# Patient Record
Sex: FEMALE | Race: BLACK OR AFRICAN AMERICAN | NOT HISPANIC OR LATINO | Employment: UNEMPLOYED | URBAN - METROPOLITAN AREA
[De-identification: names, ages, dates, MRNs, and addresses within clinical notes are randomized per-mention and may not be internally consistent; named-entity substitution may affect disease eponyms.]

---

## 2021-03-24 ENCOUNTER — OFFICE VISIT (OUTPATIENT)
Dept: URGENT CARE | Facility: CLINIC | Age: 21
End: 2021-03-24
Payer: COMMERCIAL

## 2021-03-24 VITALS
DIASTOLIC BLOOD PRESSURE: 71 MMHG | BODY MASS INDEX: 29.03 KG/M2 | RESPIRATION RATE: 16 BRPM | OXYGEN SATURATION: 99 % | HEART RATE: 73 BPM | WEIGHT: 185 LBS | TEMPERATURE: 99 F | HEIGHT: 67 IN | SYSTOLIC BLOOD PRESSURE: 121 MMHG
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
## 2021-03-24 DIAGNOSIS — S16.1XXA STRAIN OF NECK MUSCLE, INITIAL ENCOUNTER: Primary | ICD-10-CM

## 2021-03-24 DIAGNOSIS — Z76.89 ENCOUNTER TO ESTABLISH CARE WITH NEW DOCTOR: ICD-10-CM

## 2021-03-24 DIAGNOSIS — R51.9 ACUTE NONINTRACTABLE HEADACHE, UNSPECIFIED HEADACHE TYPE: ICD-10-CM

## 2021-03-24 DIAGNOSIS — M54.2 NECK PAIN: ICD-10-CM

## 2021-03-24 PROCEDURE — 99204 OFFICE O/P NEW MOD 45 MIN: CPT | Mod: S$GLB,,, | Performed by: PHYSICIAN ASSISTANT

## 2021-03-24 PROCEDURE — 3008F PR BODY MASS INDEX (BMI) DOCUMENTED: ICD-10-PCS | Mod: CPTII,S$GLB,, | Performed by: PHYSICIAN ASSISTANT

## 2021-03-24 PROCEDURE — 99204 PR OFFICE/OUTPT VISIT, NEW, LEVL IV, 45-59 MIN: ICD-10-PCS | Mod: S$GLB,,, | Performed by: PHYSICIAN ASSISTANT

## 2021-03-24 PROCEDURE — 3008F BODY MASS INDEX DOCD: CPT | Mod: CPTII,S$GLB,, | Performed by: PHYSICIAN ASSISTANT

## 2021-03-24 PROCEDURE — 1125F PR PAIN SEVERITY QUANTIFIED, PAIN PRESENT: ICD-10-PCS | Mod: S$GLB,,, | Performed by: PHYSICIAN ASSISTANT

## 2021-03-24 PROCEDURE — 72040 X-RAY EXAM NECK SPINE 2-3 VW: CPT | Mod: S$GLB,,, | Performed by: RADIOLOGY

## 2021-03-24 PROCEDURE — 72040 XR CERVICAL SPINE 2 OR 3 VIEWS: ICD-10-PCS | Mod: S$GLB,,, | Performed by: RADIOLOGY

## 2021-03-24 PROCEDURE — 1125F AMNT PAIN NOTED PAIN PRSNT: CPT | Mod: S$GLB,,, | Performed by: PHYSICIAN ASSISTANT

## 2021-03-24 RX ORDER — ONDANSETRON 4 MG/1
4 TABLET, ORALLY DISINTEGRATING ORAL EVERY 8 HOURS PRN
Qty: 12 TABLET | Refills: 0 | Status: SHIPPED | OUTPATIENT
Start: 2021-03-24 | End: 2021-03-30 | Stop reason: ALTCHOICE

## 2021-03-24 RX ORDER — ONDANSETRON 4 MG/1
4 TABLET, ORALLY DISINTEGRATING ORAL EVERY 8 HOURS PRN
Qty: 12 TABLET | Refills: 0 | Status: SHIPPED | OUTPATIENT
Start: 2021-03-24 | End: 2021-03-24 | Stop reason: CLARIF

## 2021-03-24 RX ORDER — METHOCARBAMOL 500 MG/1
500 TABLET, FILM COATED ORAL 4 TIMES DAILY PRN
Qty: 20 TABLET | Refills: 0 | Status: SHIPPED | OUTPATIENT
Start: 2021-03-24 | End: 2021-03-30 | Stop reason: ALTCHOICE

## 2021-03-24 RX ORDER — BUTALBITAL, ACETAMINOPHEN AND CAFFEINE 50; 325; 40 MG/1; MG/1; MG/1
1 TABLET ORAL EVERY 4 HOURS PRN
Qty: 8 TABLET | Refills: 0 | Status: SHIPPED | OUTPATIENT
Start: 2021-03-24 | End: 2021-03-25

## 2021-03-24 RX ORDER — DICLOFENAC SODIUM 50 MG/1
50 TABLET, DELAYED RELEASE ORAL 3 TIMES DAILY PRN
Qty: 21 TABLET | Refills: 0 | Status: SHIPPED | OUTPATIENT
Start: 2021-03-24 | End: 2021-03-24 | Stop reason: CLARIF

## 2021-03-24 RX ORDER — BUTALBITAL, ACETAMINOPHEN AND CAFFEINE 50; 325; 40 MG/1; MG/1; MG/1
1 TABLET ORAL EVERY 4 HOURS PRN
Qty: 8 TABLET | Refills: 0 | Status: SHIPPED | OUTPATIENT
Start: 2021-03-24 | End: 2021-03-24 | Stop reason: CLARIF

## 2021-03-24 RX ORDER — IBUPROFEN 800 MG/1
800 TABLET ORAL
Qty: 21 TABLET | Refills: 0 | Status: SHIPPED | OUTPATIENT
Start: 2021-03-24 | End: 2021-03-31

## 2021-03-24 RX ORDER — METHOCARBAMOL 500 MG/1
500 TABLET, FILM COATED ORAL 4 TIMES DAILY PRN
Qty: 20 TABLET | Refills: 0 | Status: SHIPPED | OUTPATIENT
Start: 2021-03-24 | End: 2021-03-24 | Stop reason: CLARIF

## 2021-03-30 ENCOUNTER — OFFICE VISIT (OUTPATIENT)
Dept: INTERNAL MEDICINE | Facility: CLINIC | Age: 21
End: 2021-03-30
Payer: COMMERCIAL

## 2021-03-30 VITALS
OXYGEN SATURATION: 98 % | DIASTOLIC BLOOD PRESSURE: 82 MMHG | WEIGHT: 192.44 LBS | BODY MASS INDEX: 30.2 KG/M2 | SYSTOLIC BLOOD PRESSURE: 130 MMHG | HEART RATE: 89 BPM | HEIGHT: 67 IN

## 2021-03-30 DIAGNOSIS — Z00.00 HEALTHCARE MAINTENANCE: ICD-10-CM

## 2021-03-30 DIAGNOSIS — F07.81 POST CONCUSSION SYNDROME: Primary | ICD-10-CM

## 2021-03-30 DIAGNOSIS — R51.9 ACUTE NONINTRACTABLE HEADACHE, UNSPECIFIED HEADACHE TYPE: ICD-10-CM

## 2021-03-30 PROCEDURE — 99213 PR OFFICE/OUTPT VISIT, EST, LEVL III, 20-29 MIN: ICD-10-PCS | Mod: S$GLB,,, | Performed by: STUDENT IN AN ORGANIZED HEALTH CARE EDUCATION/TRAINING PROGRAM

## 2021-03-30 PROCEDURE — 99999 PR PBB SHADOW E&M-EST. PATIENT-LVL III: CPT | Mod: PBBFAC,,, | Performed by: STUDENT IN AN ORGANIZED HEALTH CARE EDUCATION/TRAINING PROGRAM

## 2021-03-30 PROCEDURE — 99999 PR PBB SHADOW E&M-EST. PATIENT-LVL III: ICD-10-PCS | Mod: PBBFAC,,, | Performed by: STUDENT IN AN ORGANIZED HEALTH CARE EDUCATION/TRAINING PROGRAM

## 2021-03-30 PROCEDURE — 99213 OFFICE O/P EST LOW 20 MIN: CPT | Mod: S$GLB,,, | Performed by: STUDENT IN AN ORGANIZED HEALTH CARE EDUCATION/TRAINING PROGRAM

## 2021-03-30 RX ORDER — PROMETHAZINE HYDROCHLORIDE 12.5 MG/1
12.5 TABLET ORAL EVERY 6 HOURS PRN
Qty: 90 TABLET | Refills: 3 | Status: SHIPPED | OUTPATIENT
Start: 2021-03-30 | End: 2021-07-09

## 2021-03-30 RX ORDER — CYCLOBENZAPRINE HCL 10 MG
10 TABLET ORAL 3 TIMES DAILY PRN
Qty: 90 TABLET | Refills: 3 | Status: SHIPPED | OUTPATIENT
Start: 2021-03-30 | End: 2021-04-09

## 2021-04-01 ENCOUNTER — CLINICAL SUPPORT (OUTPATIENT)
Dept: REHABILITATION | Facility: HOSPITAL | Age: 21
End: 2021-04-01
Payer: COMMERCIAL

## 2021-04-01 DIAGNOSIS — H51.11 CONVERGENCE INSUFFICIENCY: ICD-10-CM

## 2021-04-01 DIAGNOSIS — G44.86 CERVICOGENIC HEADACHE: ICD-10-CM

## 2021-04-01 DIAGNOSIS — R51.9 ACUTE NONINTRACTABLE HEADACHE, UNSPECIFIED HEADACHE TYPE: ICD-10-CM

## 2021-04-01 DIAGNOSIS — M54.2 NECK PAIN, ACUTE: ICD-10-CM

## 2021-04-01 DIAGNOSIS — H55.82 DEFICIENT SMOOTH PURSUIT EYE MOVEMENTS: ICD-10-CM

## 2021-04-01 DIAGNOSIS — F07.81 POST CONCUSSION SYNDROME: ICD-10-CM

## 2021-04-01 DIAGNOSIS — H55.81 DEFICIENT SACCADIC EYE MOVEMENTS: ICD-10-CM

## 2021-04-01 PROCEDURE — 97162 PT EVAL MOD COMPLEX 30 MIN: CPT | Mod: PO

## 2021-04-09 ENCOUNTER — CLINICAL SUPPORT (OUTPATIENT)
Dept: REHABILITATION | Facility: HOSPITAL | Age: 21
End: 2021-04-09
Payer: COMMERCIAL

## 2021-04-09 DIAGNOSIS — H55.82 DEFICIENT SMOOTH PURSUIT EYE MOVEMENTS: ICD-10-CM

## 2021-04-09 DIAGNOSIS — G44.86 CERVICOGENIC HEADACHE: Primary | ICD-10-CM

## 2021-04-09 DIAGNOSIS — H51.11 CONVERGENCE INSUFFICIENCY: ICD-10-CM

## 2021-04-09 DIAGNOSIS — M54.2 NECK PAIN, ACUTE: ICD-10-CM

## 2021-04-09 DIAGNOSIS — H55.81 DEFICIENT SACCADIC EYE MOVEMENTS: ICD-10-CM

## 2021-04-09 PROCEDURE — 97140 MANUAL THERAPY 1/> REGIONS: CPT | Mod: PO

## 2021-04-09 PROCEDURE — 97110 THERAPEUTIC EXERCISES: CPT | Mod: PO

## 2021-04-13 ENCOUNTER — CLINICAL SUPPORT (OUTPATIENT)
Dept: REHABILITATION | Facility: HOSPITAL | Age: 21
End: 2021-04-13
Payer: COMMERCIAL

## 2021-04-13 DIAGNOSIS — H55.82 DEFICIENT SMOOTH PURSUIT EYE MOVEMENTS: ICD-10-CM

## 2021-04-13 DIAGNOSIS — H51.11 CONVERGENCE INSUFFICIENCY: ICD-10-CM

## 2021-04-13 DIAGNOSIS — G44.86 CERVICOGENIC HEADACHE: ICD-10-CM

## 2021-04-13 DIAGNOSIS — H55.81 DEFICIENT SACCADIC EYE MOVEMENTS: ICD-10-CM

## 2021-04-13 DIAGNOSIS — M54.2 NECK PAIN, ACUTE: Primary | ICD-10-CM

## 2021-04-13 PROCEDURE — 97140 MANUAL THERAPY 1/> REGIONS: CPT | Mod: PO

## 2021-04-13 PROCEDURE — 97110 THERAPEUTIC EXERCISES: CPT | Mod: PO

## 2021-04-14 ENCOUNTER — DOCUMENTATION ONLY (OUTPATIENT)
Dept: REHABILITATION | Facility: HOSPITAL | Age: 21
End: 2021-04-14

## 2021-04-16 ENCOUNTER — CLINICAL SUPPORT (OUTPATIENT)
Dept: REHABILITATION | Facility: HOSPITAL | Age: 21
End: 2021-04-16
Payer: COMMERCIAL

## 2021-04-16 DIAGNOSIS — H55.82 DEFICIENT SMOOTH PURSUIT EYE MOVEMENTS: ICD-10-CM

## 2021-04-16 DIAGNOSIS — H55.81 DEFICIENT SACCADIC EYE MOVEMENTS: ICD-10-CM

## 2021-04-16 DIAGNOSIS — G44.86 CERVICOGENIC HEADACHE: ICD-10-CM

## 2021-04-16 DIAGNOSIS — M54.2 NECK PAIN, ACUTE: ICD-10-CM

## 2021-04-16 DIAGNOSIS — H51.11 CONVERGENCE INSUFFICIENCY: ICD-10-CM

## 2021-04-16 PROCEDURE — 97112 NEUROMUSCULAR REEDUCATION: CPT | Mod: PO

## 2021-04-21 ENCOUNTER — NURSE TRIAGE (OUTPATIENT)
Dept: ADMINISTRATIVE | Facility: CLINIC | Age: 21
End: 2021-04-21

## 2021-04-22 ENCOUNTER — CLINICAL SUPPORT (OUTPATIENT)
Dept: REHABILITATION | Facility: HOSPITAL | Age: 21
End: 2021-04-22
Payer: COMMERCIAL

## 2021-04-22 DIAGNOSIS — M54.2 NECK PAIN, ACUTE: ICD-10-CM

## 2021-04-22 DIAGNOSIS — H55.81 DEFICIENT SACCADIC EYE MOVEMENTS: ICD-10-CM

## 2021-04-22 DIAGNOSIS — H55.82 DEFICIENT SMOOTH PURSUIT EYE MOVEMENTS: ICD-10-CM

## 2021-04-22 DIAGNOSIS — G44.86 CERVICOGENIC HEADACHE: ICD-10-CM

## 2021-04-22 DIAGNOSIS — H51.11 CONVERGENCE INSUFFICIENCY: ICD-10-CM

## 2021-04-22 PROCEDURE — 97110 THERAPEUTIC EXERCISES: CPT | Mod: PO

## 2021-04-22 PROCEDURE — 97140 MANUAL THERAPY 1/> REGIONS: CPT | Mod: PO

## 2021-04-27 ENCOUNTER — CLINICAL SUPPORT (OUTPATIENT)
Dept: REHABILITATION | Facility: HOSPITAL | Age: 21
End: 2021-04-27
Payer: COMMERCIAL

## 2021-04-27 DIAGNOSIS — H55.82 DEFICIENT SMOOTH PURSUIT EYE MOVEMENTS: ICD-10-CM

## 2021-04-27 DIAGNOSIS — H55.81 DEFICIENT SACCADIC EYE MOVEMENTS: ICD-10-CM

## 2021-04-27 DIAGNOSIS — H51.11 CONVERGENCE INSUFFICIENCY: ICD-10-CM

## 2021-04-27 DIAGNOSIS — M54.2 NECK PAIN, ACUTE: ICD-10-CM

## 2021-04-27 DIAGNOSIS — G44.86 CERVICOGENIC HEADACHE: ICD-10-CM

## 2021-04-27 PROCEDURE — 97110 THERAPEUTIC EXERCISES: CPT | Mod: PO

## 2021-04-27 PROCEDURE — 97140 MANUAL THERAPY 1/> REGIONS: CPT | Mod: PO

## 2021-05-05 ENCOUNTER — DOCUMENTATION ONLY (OUTPATIENT)
Dept: REHABILITATION | Facility: HOSPITAL | Age: 21
End: 2021-05-05

## 2021-05-05 ENCOUNTER — CLINICAL SUPPORT (OUTPATIENT)
Dept: REHABILITATION | Facility: HOSPITAL | Age: 21
End: 2021-05-05
Payer: COMMERCIAL

## 2021-05-05 DIAGNOSIS — M54.2 NECK PAIN, ACUTE: ICD-10-CM

## 2021-05-05 DIAGNOSIS — H55.82 DEFICIENT SMOOTH PURSUIT EYE MOVEMENTS: ICD-10-CM

## 2021-05-05 DIAGNOSIS — H55.81 DEFICIENT SACCADIC EYE MOVEMENTS: ICD-10-CM

## 2021-05-05 DIAGNOSIS — H51.11 CONVERGENCE INSUFFICIENCY: ICD-10-CM

## 2021-05-05 DIAGNOSIS — G44.86 CERVICOGENIC HEADACHE: ICD-10-CM

## 2021-05-05 PROCEDURE — 97110 THERAPEUTIC EXERCISES: CPT | Mod: PO

## 2021-05-05 PROCEDURE — 97140 MANUAL THERAPY 1/> REGIONS: CPT | Mod: PO

## 2021-05-11 ENCOUNTER — OFFICE VISIT (OUTPATIENT)
Dept: INTERNAL MEDICINE | Facility: CLINIC | Age: 21
End: 2021-05-11
Payer: COMMERCIAL

## 2021-05-11 VITALS
SYSTOLIC BLOOD PRESSURE: 112 MMHG | WEIGHT: 194.44 LBS | HEIGHT: 67 IN | HEART RATE: 91 BPM | BODY MASS INDEX: 30.52 KG/M2 | DIASTOLIC BLOOD PRESSURE: 66 MMHG | OXYGEN SATURATION: 99 %

## 2021-05-11 DIAGNOSIS — F41.9 ANXIETY: ICD-10-CM

## 2021-05-11 DIAGNOSIS — Z91.018 FOOD ALLERGY: Primary | ICD-10-CM

## 2021-05-11 DIAGNOSIS — G44.86 CERVICOGENIC HEADACHE: ICD-10-CM

## 2021-05-11 PROCEDURE — 99213 PR OFFICE/OUTPT VISIT, EST, LEVL III, 20-29 MIN: ICD-10-PCS | Mod: S$PBB,,, | Performed by: STUDENT IN AN ORGANIZED HEALTH CARE EDUCATION/TRAINING PROGRAM

## 2021-05-11 PROCEDURE — 99999 PR PBB SHADOW E&M-EST. PATIENT-LVL IV: CPT | Mod: PBBFAC,,, | Performed by: STUDENT IN AN ORGANIZED HEALTH CARE EDUCATION/TRAINING PROGRAM

## 2021-05-11 PROCEDURE — 99999 PR PBB SHADOW E&M-EST. PATIENT-LVL IV: ICD-10-PCS | Mod: PBBFAC,,, | Performed by: STUDENT IN AN ORGANIZED HEALTH CARE EDUCATION/TRAINING PROGRAM

## 2021-05-11 PROCEDURE — 99213 OFFICE O/P EST LOW 20 MIN: CPT | Mod: S$PBB,,, | Performed by: STUDENT IN AN ORGANIZED HEALTH CARE EDUCATION/TRAINING PROGRAM

## 2021-05-21 ENCOUNTER — OFFICE VISIT (OUTPATIENT)
Dept: PSYCHIATRY | Facility: CLINIC | Age: 21
End: 2021-05-21
Payer: COMMERCIAL

## 2021-05-21 DIAGNOSIS — F41.1 GAD (GENERALIZED ANXIETY DISORDER): ICD-10-CM

## 2021-05-21 DIAGNOSIS — F43.10 PTSD (POST-TRAUMATIC STRESS DISORDER): ICD-10-CM

## 2021-05-21 DIAGNOSIS — F33.2 SEVERE EPISODE OF RECURRENT MAJOR DEPRESSIVE DISORDER, WITHOUT PSYCHOTIC FEATURES: Primary | ICD-10-CM

## 2021-05-21 PROCEDURE — 90791 PR PSYCHIATRIC DIAGNOSTIC EVALUATION: ICD-10-PCS | Mod: S$GLB,,, | Performed by: SOCIAL WORKER

## 2021-05-21 PROCEDURE — 90791 PSYCH DIAGNOSTIC EVALUATION: CPT | Mod: S$GLB,,, | Performed by: SOCIAL WORKER

## 2021-05-25 ENCOUNTER — CLINICAL SUPPORT (OUTPATIENT)
Dept: REHABILITATION | Facility: HOSPITAL | Age: 21
End: 2021-05-25
Payer: COMMERCIAL

## 2021-05-25 DIAGNOSIS — H55.81 DEFICIENT SACCADIC EYE MOVEMENTS: ICD-10-CM

## 2021-05-25 DIAGNOSIS — M54.2 NECK PAIN, ACUTE: ICD-10-CM

## 2021-05-25 DIAGNOSIS — H51.11 CONVERGENCE INSUFFICIENCY: ICD-10-CM

## 2021-05-25 DIAGNOSIS — H55.82 DEFICIENT SMOOTH PURSUIT EYE MOVEMENTS: ICD-10-CM

## 2021-05-25 DIAGNOSIS — G44.86 CERVICOGENIC HEADACHE: ICD-10-CM

## 2021-05-25 PROCEDURE — 97140 MANUAL THERAPY 1/> REGIONS: CPT | Mod: PO,CQ

## 2021-05-25 PROCEDURE — 97110 THERAPEUTIC EXERCISES: CPT | Mod: PO,CQ

## 2021-05-27 ENCOUNTER — OFFICE VISIT (OUTPATIENT)
Dept: PSYCHIATRY | Facility: CLINIC | Age: 21
End: 2021-05-27
Payer: COMMERCIAL

## 2021-05-27 DIAGNOSIS — F33.2 SEVERE EPISODE OF RECURRENT MAJOR DEPRESSIVE DISORDER, WITHOUT PSYCHOTIC FEATURES: Primary | ICD-10-CM

## 2021-05-27 DIAGNOSIS — F41.1 GAD (GENERALIZED ANXIETY DISORDER): ICD-10-CM

## 2021-05-27 DIAGNOSIS — F43.10 PTSD (POST-TRAUMATIC STRESS DISORDER): ICD-10-CM

## 2021-05-27 PROCEDURE — 90834 PSYTX W PT 45 MINUTES: CPT | Mod: S$GLB,,, | Performed by: SOCIAL WORKER

## 2021-05-27 PROCEDURE — 90834 PR PSYCHOTHERAPY W/PATIENT, 45 MIN: ICD-10-PCS | Mod: S$GLB,,, | Performed by: SOCIAL WORKER

## 2021-05-31 ENCOUNTER — OFFICE VISIT (OUTPATIENT)
Dept: PSYCHIATRY | Facility: CLINIC | Age: 21
End: 2021-05-31
Payer: COMMERCIAL

## 2021-05-31 VITALS
BODY MASS INDEX: 29.57 KG/M2 | SYSTOLIC BLOOD PRESSURE: 112 MMHG | HEART RATE: 71 BPM | WEIGHT: 188.81 LBS | DIASTOLIC BLOOD PRESSURE: 73 MMHG

## 2021-05-31 DIAGNOSIS — F41.9 ANXIETY: ICD-10-CM

## 2021-05-31 DIAGNOSIS — M79.10 MYALGIA: ICD-10-CM

## 2021-05-31 DIAGNOSIS — R53.83 FATIGUE, UNSPECIFIED TYPE: ICD-10-CM

## 2021-05-31 DIAGNOSIS — F43.10 PTSD (POST-TRAUMATIC STRESS DISORDER): Primary | ICD-10-CM

## 2021-05-31 DIAGNOSIS — F41.1 GAD (GENERALIZED ANXIETY DISORDER): ICD-10-CM

## 2021-05-31 DIAGNOSIS — F33.2 SEVERE EPISODE OF RECURRENT MAJOR DEPRESSIVE DISORDER, WITHOUT PSYCHOTIC FEATURES: ICD-10-CM

## 2021-05-31 PROCEDURE — 99999 PR PBB SHADOW E&M-EST. PATIENT-LVL III: ICD-10-PCS | Mod: PBBFAC,,, | Performed by: NURSE PRACTITIONER

## 2021-05-31 PROCEDURE — 90792 PR PSYCHIATRIC DIAGNOSTIC EVALUATION W/MEDICAL SERVICES: ICD-10-PCS | Mod: S$GLB,,, | Performed by: NURSE PRACTITIONER

## 2021-05-31 PROCEDURE — 99999 PR PBB SHADOW E&M-EST. PATIENT-LVL III: CPT | Mod: PBBFAC,,, | Performed by: NURSE PRACTITIONER

## 2021-05-31 PROCEDURE — 90792 PSYCH DIAG EVAL W/MED SRVCS: CPT | Mod: S$GLB,,, | Performed by: NURSE PRACTITIONER

## 2021-05-31 RX ORDER — SERTRALINE HYDROCHLORIDE 25 MG/1
25 TABLET, FILM COATED ORAL DAILY
Qty: 30 TABLET | Refills: 1 | Status: SHIPPED | OUTPATIENT
Start: 2021-05-31 | End: 2021-06-28

## 2021-06-04 ENCOUNTER — DOCUMENTATION ONLY (OUTPATIENT)
Dept: REHABILITATION | Facility: HOSPITAL | Age: 21
End: 2021-06-04

## 2021-06-08 ENCOUNTER — CLINICAL SUPPORT (OUTPATIENT)
Dept: REHABILITATION | Facility: HOSPITAL | Age: 21
End: 2021-06-08
Payer: COMMERCIAL

## 2021-06-08 DIAGNOSIS — H55.81 DEFICIENT SACCADIC EYE MOVEMENTS: ICD-10-CM

## 2021-06-08 DIAGNOSIS — G44.86 CERVICOGENIC HEADACHE: ICD-10-CM

## 2021-06-08 DIAGNOSIS — M54.2 NECK PAIN, ACUTE: ICD-10-CM

## 2021-06-08 DIAGNOSIS — H51.11 CONVERGENCE INSUFFICIENCY: ICD-10-CM

## 2021-06-08 DIAGNOSIS — H55.82 DEFICIENT SMOOTH PURSUIT EYE MOVEMENTS: ICD-10-CM

## 2021-06-08 PROCEDURE — 97110 THERAPEUTIC EXERCISES: CPT | Mod: PO

## 2021-06-09 ENCOUNTER — TELEPHONE (OUTPATIENT)
Dept: INTERNAL MEDICINE | Facility: CLINIC | Age: 21
End: 2021-06-09

## 2021-06-09 DIAGNOSIS — F07.81 POST CONCUSSION SYNDROME: Primary | ICD-10-CM

## 2021-06-14 ENCOUNTER — CLINICAL SUPPORT (OUTPATIENT)
Dept: REHABILITATION | Facility: HOSPITAL | Age: 21
End: 2021-06-14
Payer: COMMERCIAL

## 2021-06-14 DIAGNOSIS — F07.81 POST CONCUSSION SYNDROME: ICD-10-CM

## 2021-06-14 PROCEDURE — 97165 OT EVAL LOW COMPLEX 30 MIN: CPT | Mod: PO

## 2021-06-14 PROCEDURE — 97530 THERAPEUTIC ACTIVITIES: CPT | Mod: PO

## 2021-06-15 ENCOUNTER — OFFICE VISIT (OUTPATIENT)
Dept: PSYCHIATRY | Facility: CLINIC | Age: 21
End: 2021-06-15
Payer: COMMERCIAL

## 2021-06-15 DIAGNOSIS — F43.10 PTSD (POST-TRAUMATIC STRESS DISORDER): ICD-10-CM

## 2021-06-15 DIAGNOSIS — F33.2 SEVERE EPISODE OF RECURRENT MAJOR DEPRESSIVE DISORDER, WITHOUT PSYCHOTIC FEATURES: ICD-10-CM

## 2021-06-15 DIAGNOSIS — F41.9 ANXIETY: Primary | ICD-10-CM

## 2021-06-15 PROCEDURE — 90834 PSYTX W PT 45 MINUTES: CPT | Mod: S$GLB,,, | Performed by: SOCIAL WORKER

## 2021-06-15 PROCEDURE — 90834 PR PSYCHOTHERAPY W/PATIENT, 45 MIN: ICD-10-PCS | Mod: S$GLB,,, | Performed by: SOCIAL WORKER

## 2021-06-18 ENCOUNTER — CLINICAL SUPPORT (OUTPATIENT)
Dept: REHABILITATION | Facility: HOSPITAL | Age: 21
End: 2021-06-18
Payer: COMMERCIAL

## 2021-06-18 DIAGNOSIS — H55.81 DEFICIENT SACCADIC EYE MOVEMENTS: ICD-10-CM

## 2021-06-18 DIAGNOSIS — M54.2 NECK PAIN, ACUTE: ICD-10-CM

## 2021-06-18 DIAGNOSIS — G44.86 CERVICOGENIC HEADACHE: ICD-10-CM

## 2021-06-18 DIAGNOSIS — H55.82 DEFICIENT SMOOTH PURSUIT EYE MOVEMENTS: ICD-10-CM

## 2021-06-18 DIAGNOSIS — H51.11 CONVERGENCE INSUFFICIENCY: ICD-10-CM

## 2021-06-18 PROCEDURE — 97140 MANUAL THERAPY 1/> REGIONS: CPT | Mod: PO

## 2021-06-18 PROCEDURE — 97110 THERAPEUTIC EXERCISES: CPT | Mod: PO

## 2021-06-21 ENCOUNTER — PATIENT MESSAGE (OUTPATIENT)
Dept: PSYCHIATRY | Facility: CLINIC | Age: 21
End: 2021-06-21

## 2021-06-21 ENCOUNTER — CLINICAL SUPPORT (OUTPATIENT)
Dept: REHABILITATION | Facility: HOSPITAL | Age: 21
End: 2021-06-21
Payer: COMMERCIAL

## 2021-06-21 PROCEDURE — 97530 THERAPEUTIC ACTIVITIES: CPT | Mod: PO

## 2021-06-21 PROCEDURE — 97112 NEUROMUSCULAR REEDUCATION: CPT | Mod: PO

## 2021-07-06 ENCOUNTER — CLINICAL SUPPORT (OUTPATIENT)
Dept: REHABILITATION | Facility: HOSPITAL | Age: 21
End: 2021-07-06
Payer: COMMERCIAL

## 2021-07-06 DIAGNOSIS — H55.81 DEFICIENT SACCADIC EYE MOVEMENTS: ICD-10-CM

## 2021-07-06 DIAGNOSIS — M54.2 NECK PAIN, ACUTE: ICD-10-CM

## 2021-07-06 DIAGNOSIS — H51.11 CONVERGENCE INSUFFICIENCY: ICD-10-CM

## 2021-07-06 DIAGNOSIS — G44.86 CERVICOGENIC HEADACHE: ICD-10-CM

## 2021-07-06 DIAGNOSIS — H55.82 DEFICIENT SMOOTH PURSUIT EYE MOVEMENTS: ICD-10-CM

## 2021-07-06 PROCEDURE — 97110 THERAPEUTIC EXERCISES: CPT | Mod: PO,CQ

## 2021-07-06 PROCEDURE — 97530 THERAPEUTIC ACTIVITIES: CPT | Mod: PO

## 2021-07-06 PROCEDURE — 97112 NEUROMUSCULAR REEDUCATION: CPT | Mod: PO

## 2021-07-07 ENCOUNTER — PATIENT MESSAGE (OUTPATIENT)
Dept: REHABILITATION | Facility: HOSPITAL | Age: 21
End: 2021-07-07

## 2021-07-07 ENCOUNTER — TELEPHONE (OUTPATIENT)
Dept: REHABILITATION | Facility: HOSPITAL | Age: 21
End: 2021-07-07

## 2021-07-09 ENCOUNTER — DOCUMENTATION ONLY (OUTPATIENT)
Dept: REHABILITATION | Facility: HOSPITAL | Age: 21
End: 2021-07-09

## 2021-07-09 ENCOUNTER — OFFICE VISIT (OUTPATIENT)
Dept: OBSTETRICS AND GYNECOLOGY | Facility: CLINIC | Age: 21
End: 2021-07-09
Payer: COMMERCIAL

## 2021-07-09 VITALS
SYSTOLIC BLOOD PRESSURE: 114 MMHG | BODY MASS INDEX: 28.94 KG/M2 | WEIGHT: 184.75 LBS | DIASTOLIC BLOOD PRESSURE: 80 MMHG

## 2021-07-09 DIAGNOSIS — Z01.419 WELL WOMAN EXAM: Primary | ICD-10-CM

## 2021-07-09 DIAGNOSIS — Z20.2 POSSIBLE EXPOSURE TO STD: ICD-10-CM

## 2021-07-09 DIAGNOSIS — Z30.09 ENCOUNTER FOR COUNSELING REGARDING CONTRACEPTION: ICD-10-CM

## 2021-07-09 LAB
B-HCG UR QL: NEGATIVE
CTP QC/QA: YES

## 2021-07-09 PROCEDURE — 87481 CANDIDA DNA AMP PROBE: CPT | Mod: 59 | Performed by: PHYSICIAN ASSISTANT

## 2021-07-09 PROCEDURE — 99204 PR OFFICE/OUTPT VISIT, NEW, LEVL IV, 45-59 MIN: ICD-10-PCS | Mod: S$PBB,,, | Performed by: PHYSICIAN ASSISTANT

## 2021-07-09 PROCEDURE — 99999 PR PBB SHADOW E&M-EST. PATIENT-LVL II: ICD-10-PCS | Mod: PBBFAC,,, | Performed by: PHYSICIAN ASSISTANT

## 2021-07-09 PROCEDURE — 87491 CHLMYD TRACH DNA AMP PROBE: CPT | Performed by: PHYSICIAN ASSISTANT

## 2021-07-09 PROCEDURE — 87529 HSV DNA AMP PROBE: CPT | Performed by: PHYSICIAN ASSISTANT

## 2021-07-09 PROCEDURE — 87591 N.GONORRHOEAE DNA AMP PROB: CPT | Performed by: PHYSICIAN ASSISTANT

## 2021-07-09 PROCEDURE — 81025 URINE PREGNANCY TEST: CPT | Mod: PBBFAC | Performed by: PHYSICIAN ASSISTANT

## 2021-07-09 PROCEDURE — 87661 TRICHOMONAS VAGINALIS AMPLIF: CPT | Mod: 59 | Performed by: PHYSICIAN ASSISTANT

## 2021-07-09 PROCEDURE — 99999 PR PBB SHADOW E&M-EST. PATIENT-LVL II: CPT | Mod: PBBFAC,,, | Performed by: PHYSICIAN ASSISTANT

## 2021-07-09 PROCEDURE — 99204 OFFICE O/P NEW MOD 45 MIN: CPT | Mod: S$PBB,,, | Performed by: PHYSICIAN ASSISTANT

## 2021-07-09 RX ORDER — ETONOGESTREL AND ETHINYL ESTRADIOL VAGINAL RING .015; .12 MG/D; MG/D
1 RING VAGINAL
Qty: 1 EACH | Refills: 11 | Status: SHIPPED | OUTPATIENT
Start: 2021-07-09 | End: 2021-09-21

## 2021-07-12 LAB
HSV1 DNA SPEC QL NAA+PROBE: NEGATIVE
HSV2 DNA SPEC QL NAA+PROBE: NEGATIVE
SPECIMEN SOURCE: NORMAL

## 2021-07-13 LAB
BACTERIAL VAGINOSIS DNA: POSITIVE
CANDIDA GLABRATA DNA: NEGATIVE
CANDIDA KRUSEI DNA: NEGATIVE
CANDIDA RRNA VAG QL PROBE: NEGATIVE
T VAGINALIS RRNA GENITAL QL PROBE: NEGATIVE

## 2021-07-14 DIAGNOSIS — B96.89 BACTERIAL VAGINOSIS: Primary | ICD-10-CM

## 2021-07-14 DIAGNOSIS — N76.0 BACTERIAL VAGINOSIS: Primary | ICD-10-CM

## 2021-07-14 LAB
C TRACH DNA SPEC QL NAA+PROBE: NOT DETECTED
N GONORRHOEA DNA SPEC QL NAA+PROBE: NOT DETECTED

## 2021-07-14 RX ORDER — METRONIDAZOLE 500 MG/1
500 TABLET ORAL EVERY 12 HOURS
Qty: 14 TABLET | Refills: 0 | Status: SHIPPED | OUTPATIENT
Start: 2021-07-14 | End: 2021-07-21

## 2021-07-16 ENCOUNTER — CLINICAL SUPPORT (OUTPATIENT)
Dept: REHABILITATION | Facility: HOSPITAL | Age: 21
End: 2021-07-16
Payer: COMMERCIAL

## 2021-07-16 DIAGNOSIS — H55.81 DEFICIENT SACCADIC EYE MOVEMENTS: ICD-10-CM

## 2021-07-16 DIAGNOSIS — H55.82 DEFICIENT SMOOTH PURSUIT EYE MOVEMENTS: ICD-10-CM

## 2021-07-16 DIAGNOSIS — H51.11 CONVERGENCE INSUFFICIENCY: ICD-10-CM

## 2021-07-16 DIAGNOSIS — M54.2 NECK PAIN, ACUTE: ICD-10-CM

## 2021-07-16 DIAGNOSIS — G44.86 CERVICOGENIC HEADACHE: ICD-10-CM

## 2021-07-16 PROCEDURE — 97110 THERAPEUTIC EXERCISES: CPT | Mod: PO,CQ

## 2021-07-16 PROCEDURE — 97140 MANUAL THERAPY 1/> REGIONS: CPT | Mod: PO,CQ

## 2021-07-16 PROCEDURE — 97530 THERAPEUTIC ACTIVITIES: CPT | Mod: PO

## 2021-07-16 PROCEDURE — 97112 NEUROMUSCULAR REEDUCATION: CPT | Mod: PO

## 2021-07-23 ENCOUNTER — CLINICAL SUPPORT (OUTPATIENT)
Dept: REHABILITATION | Facility: HOSPITAL | Age: 21
End: 2021-07-23
Payer: COMMERCIAL

## 2021-07-23 DIAGNOSIS — M54.2 NECK PAIN, ACUTE: ICD-10-CM

## 2021-07-23 DIAGNOSIS — H55.81 DEFICIENT SACCADIC EYE MOVEMENTS: ICD-10-CM

## 2021-07-23 DIAGNOSIS — H55.82 DEFICIENT SMOOTH PURSUIT EYE MOVEMENTS: ICD-10-CM

## 2021-07-23 DIAGNOSIS — H51.11 CONVERGENCE INSUFFICIENCY: ICD-10-CM

## 2021-07-23 DIAGNOSIS — G44.86 CERVICOGENIC HEADACHE: ICD-10-CM

## 2021-07-23 PROCEDURE — 97530 THERAPEUTIC ACTIVITIES: CPT | Mod: PO

## 2021-07-23 PROCEDURE — 97110 THERAPEUTIC EXERCISES: CPT | Mod: PO

## 2021-07-23 PROCEDURE — 97140 MANUAL THERAPY 1/> REGIONS: CPT | Mod: PO

## 2021-07-23 PROCEDURE — 97112 NEUROMUSCULAR REEDUCATION: CPT | Mod: PO

## 2021-07-27 ENCOUNTER — OFFICE VISIT (OUTPATIENT)
Dept: PSYCHIATRY | Facility: CLINIC | Age: 21
End: 2021-07-27
Payer: COMMERCIAL

## 2021-07-27 VITALS
SYSTOLIC BLOOD PRESSURE: 116 MMHG | WEIGHT: 182.31 LBS | HEART RATE: 69 BPM | BODY MASS INDEX: 28.56 KG/M2 | DIASTOLIC BLOOD PRESSURE: 70 MMHG

## 2021-07-27 DIAGNOSIS — F43.10 PTSD (POST-TRAUMATIC STRESS DISORDER): Primary | ICD-10-CM

## 2021-07-27 DIAGNOSIS — F41.1 GAD (GENERALIZED ANXIETY DISORDER): ICD-10-CM

## 2021-07-27 DIAGNOSIS — G47.00 INSOMNIA, UNSPECIFIED TYPE: ICD-10-CM

## 2021-07-27 DIAGNOSIS — F33.2 SEVERE EPISODE OF RECURRENT MAJOR DEPRESSIVE DISORDER, WITHOUT PSYCHOTIC FEATURES: ICD-10-CM

## 2021-07-27 DIAGNOSIS — F41.9 ANXIETY: ICD-10-CM

## 2021-07-27 PROCEDURE — 99999 PR PBB SHADOW E&M-EST. PATIENT-LVL II: ICD-10-PCS | Mod: PBBFAC,,, | Performed by: NURSE PRACTITIONER

## 2021-07-27 PROCEDURE — 90834 PSYTX W PT 45 MINUTES: CPT | Mod: S$GLB,,, | Performed by: SOCIAL WORKER

## 2021-07-27 PROCEDURE — 90834 PR PSYCHOTHERAPY W/PATIENT, 45 MIN: ICD-10-PCS | Mod: S$GLB,,, | Performed by: SOCIAL WORKER

## 2021-07-27 PROCEDURE — 99214 PR OFFICE/OUTPT VISIT, EST, LEVL IV, 30-39 MIN: ICD-10-PCS | Mod: S$GLB,,, | Performed by: NURSE PRACTITIONER

## 2021-07-27 PROCEDURE — 99214 OFFICE O/P EST MOD 30 MIN: CPT | Mod: S$GLB,,, | Performed by: NURSE PRACTITIONER

## 2021-07-27 PROCEDURE — 99999 PR PBB SHADOW E&M-EST. PATIENT-LVL II: CPT | Mod: PBBFAC,,, | Performed by: NURSE PRACTITIONER

## 2021-07-27 RX ORDER — TRAZODONE HYDROCHLORIDE 50 MG/1
50 TABLET ORAL NIGHTLY
Qty: 30 TABLET | Refills: 1 | Status: SHIPPED | OUTPATIENT
Start: 2021-07-27 | End: 2021-08-25

## 2021-07-27 RX ORDER — SERTRALINE HYDROCHLORIDE 50 MG/1
50 TABLET, FILM COATED ORAL DAILY
Qty: 30 TABLET | Refills: 1 | Status: SHIPPED | OUTPATIENT
Start: 2021-07-27 | End: 2021-08-25

## 2021-07-30 ENCOUNTER — DOCUMENTATION ONLY (OUTPATIENT)
Dept: REHABILITATION | Facility: HOSPITAL | Age: 21
End: 2021-07-30

## 2021-07-30 ENCOUNTER — TELEPHONE (OUTPATIENT)
Dept: PSYCHIATRY | Facility: CLINIC | Age: 21
End: 2021-07-30

## 2021-07-30 ENCOUNTER — PATIENT MESSAGE (OUTPATIENT)
Dept: PSYCHIATRY | Facility: CLINIC | Age: 21
End: 2021-07-30

## 2021-08-05 ENCOUNTER — TELEPHONE (OUTPATIENT)
Dept: PSYCHIATRY | Facility: CLINIC | Age: 21
End: 2021-08-05

## 2021-08-05 ENCOUNTER — PATIENT MESSAGE (OUTPATIENT)
Dept: PSYCHIATRY | Facility: CLINIC | Age: 21
End: 2021-08-05

## 2021-08-06 ENCOUNTER — OFFICE VISIT (OUTPATIENT)
Dept: PSYCHIATRY | Facility: CLINIC | Age: 21
End: 2021-08-06
Payer: COMMERCIAL

## 2021-08-06 ENCOUNTER — PATIENT MESSAGE (OUTPATIENT)
Dept: PSYCHIATRY | Facility: CLINIC | Age: 21
End: 2021-08-06

## 2021-08-06 ENCOUNTER — DOCUMENTATION ONLY (OUTPATIENT)
Dept: REHABILITATION | Facility: HOSPITAL | Age: 21
End: 2021-08-06

## 2021-08-06 DIAGNOSIS — F43.10 PTSD (POST-TRAUMATIC STRESS DISORDER): ICD-10-CM

## 2021-08-06 DIAGNOSIS — F33.2 MDD (MAJOR DEPRESSIVE DISORDER), RECURRENT SEVERE, WITHOUT PSYCHOSIS: Primary | ICD-10-CM

## 2021-08-06 DIAGNOSIS — F41.1 GAD (GENERALIZED ANXIETY DISORDER): ICD-10-CM

## 2021-08-06 PROCEDURE — 90791 PSYCH DIAGNOSTIC EVALUATION: CPT | Mod: 95,,, | Performed by: PSYCHOLOGIST

## 2021-08-06 PROCEDURE — 90791 PR PSYCHIATRIC DIAGNOSTIC EVALUATION: ICD-10-PCS | Mod: 95,,, | Performed by: PSYCHOLOGIST

## 2021-08-09 ENCOUNTER — PATIENT MESSAGE (OUTPATIENT)
Dept: INTERNAL MEDICINE | Facility: CLINIC | Age: 21
End: 2021-08-09

## 2021-08-12 ENCOUNTER — TELEPHONE (OUTPATIENT)
Dept: INTERNAL MEDICINE | Facility: CLINIC | Age: 21
End: 2021-08-12

## 2021-08-13 ENCOUNTER — CLINICAL SUPPORT (OUTPATIENT)
Dept: REHABILITATION | Facility: HOSPITAL | Age: 21
End: 2021-08-13
Payer: COMMERCIAL

## 2021-08-13 DIAGNOSIS — H55.81 DEFICIENT SACCADIC EYE MOVEMENTS: ICD-10-CM

## 2021-08-13 DIAGNOSIS — H51.11 CONVERGENCE INSUFFICIENCY: ICD-10-CM

## 2021-08-13 DIAGNOSIS — M54.2 NECK PAIN, ACUTE: ICD-10-CM

## 2021-08-13 DIAGNOSIS — H55.82 DEFICIENT SMOOTH PURSUIT EYE MOVEMENTS: ICD-10-CM

## 2021-08-13 DIAGNOSIS — G44.86 CERVICOGENIC HEADACHE: ICD-10-CM

## 2021-08-13 PROCEDURE — 97110 THERAPEUTIC EXERCISES: CPT | Mod: PO,CQ

## 2021-08-13 PROCEDURE — 97530 THERAPEUTIC ACTIVITIES: CPT | Mod: PO

## 2021-08-16 ENCOUNTER — LAB VISIT (OUTPATIENT)
Dept: LAB | Facility: HOSPITAL | Age: 21
End: 2021-08-16
Payer: COMMERCIAL

## 2021-08-16 ENCOUNTER — OFFICE VISIT (OUTPATIENT)
Dept: PSYCHIATRY | Facility: CLINIC | Age: 21
End: 2021-08-16
Payer: COMMERCIAL

## 2021-08-16 DIAGNOSIS — F41.9 ANXIETY: ICD-10-CM

## 2021-08-16 DIAGNOSIS — R53.83 FATIGUE, UNSPECIFIED TYPE: ICD-10-CM

## 2021-08-16 DIAGNOSIS — M79.10 MYALGIA: ICD-10-CM

## 2021-08-16 DIAGNOSIS — F33.2 MDD (MAJOR DEPRESSIVE DISORDER), RECURRENT SEVERE, WITHOUT PSYCHOSIS: ICD-10-CM

## 2021-08-16 DIAGNOSIS — F43.10 PTSD (POST-TRAUMATIC STRESS DISORDER): Primary | ICD-10-CM

## 2021-08-16 DIAGNOSIS — F43.10 PTSD (POST-TRAUMATIC STRESS DISORDER): ICD-10-CM

## 2021-08-16 DIAGNOSIS — F33.2 SEVERE EPISODE OF RECURRENT MAJOR DEPRESSIVE DISORDER, WITHOUT PSYCHOTIC FEATURES: ICD-10-CM

## 2021-08-16 DIAGNOSIS — F41.1 GAD (GENERALIZED ANXIETY DISORDER): ICD-10-CM

## 2021-08-16 LAB
25(OH)D3+25(OH)D2 SERPL-MCNC: 12 NG/ML (ref 30–96)
ALBUMIN SERPL BCP-MCNC: 4.1 G/DL (ref 3.5–5.2)
ALP SERPL-CCNC: 55 U/L (ref 55–135)
ALT SERPL W/O P-5'-P-CCNC: 11 U/L (ref 10–44)
ANION GAP SERPL CALC-SCNC: 10 MMOL/L (ref 8–16)
AST SERPL-CCNC: 13 U/L (ref 10–40)
BASOPHILS # BLD AUTO: 0.04 K/UL (ref 0–0.2)
BASOPHILS NFR BLD: 0.8 % (ref 0–1.9)
BILIRUB SERPL-MCNC: 0.2 MG/DL (ref 0.1–1)
BUN SERPL-MCNC: 5 MG/DL (ref 6–20)
CALCIUM SERPL-MCNC: 9.6 MG/DL (ref 8.7–10.5)
CHLORIDE SERPL-SCNC: 106 MMOL/L (ref 95–110)
CO2 SERPL-SCNC: 24 MMOL/L (ref 23–29)
CREAT SERPL-MCNC: 0.7 MG/DL (ref 0.5–1.4)
DIFFERENTIAL METHOD: ABNORMAL
EOSINOPHIL # BLD AUTO: 0.1 K/UL (ref 0–0.5)
EOSINOPHIL NFR BLD: 1.2 % (ref 0–8)
ERYTHROCYTE [DISTWIDTH] IN BLOOD BY AUTOMATED COUNT: 13.8 % (ref 11.5–14.5)
EST. GFR  (AFRICAN AMERICAN): >60 ML/MIN/1.73 M^2
EST. GFR  (NON AFRICAN AMERICAN): >60 ML/MIN/1.73 M^2
GLUCOSE SERPL-MCNC: 76 MG/DL (ref 70–110)
HCT VFR BLD AUTO: 40.4 % (ref 37–48.5)
HGB BLD-MCNC: 12.6 G/DL (ref 12–16)
IMM GRANULOCYTES # BLD AUTO: 0.02 K/UL (ref 0–0.04)
IMM GRANULOCYTES NFR BLD AUTO: 0.4 % (ref 0–0.5)
LYMPHOCYTES # BLD AUTO: 1.9 K/UL (ref 1–4.8)
LYMPHOCYTES NFR BLD: 39.1 % (ref 18–48)
MCH RBC QN AUTO: 26.6 PG (ref 27–31)
MCHC RBC AUTO-ENTMCNC: 31.2 G/DL (ref 32–36)
MCV RBC AUTO: 85 FL (ref 82–98)
MONOCYTES # BLD AUTO: 0.3 K/UL (ref 0.3–1)
MONOCYTES NFR BLD: 5.9 % (ref 4–15)
NEUTROPHILS # BLD AUTO: 2.6 K/UL (ref 1.8–7.7)
NEUTROPHILS NFR BLD: 52.6 % (ref 38–73)
NRBC BLD-RTO: 0 /100 WBC
PLATELET # BLD AUTO: 301 K/UL (ref 150–450)
PLATELET BLD QL SMEAR: ABNORMAL
PMV BLD AUTO: 10.5 FL (ref 9.2–12.9)
POTASSIUM SERPL-SCNC: 3.9 MMOL/L (ref 3.5–5.1)
PROT SERPL-MCNC: 7.5 G/DL (ref 6–8.4)
RBC # BLD AUTO: 4.73 M/UL (ref 4–5.4)
SODIUM SERPL-SCNC: 140 MMOL/L (ref 136–145)
TSH SERPL DL<=0.005 MIU/L-ACNC: 2.26 UIU/ML (ref 0.4–4)
VIT B12 SERPL-MCNC: 527 PG/ML (ref 210–950)
WBC # BLD AUTO: 4.89 K/UL (ref 3.9–12.7)

## 2021-08-16 PROCEDURE — 85025 COMPLETE CBC W/AUTO DIFF WBC: CPT | Performed by: NURSE PRACTITIONER

## 2021-08-16 PROCEDURE — 90834 PSYTX W PT 45 MINUTES: CPT | Mod: S$GLB,,, | Performed by: PSYCHOLOGIST

## 2021-08-16 PROCEDURE — 84443 ASSAY THYROID STIM HORMONE: CPT | Performed by: NURSE PRACTITIONER

## 2021-08-16 PROCEDURE — 80053 COMPREHEN METABOLIC PANEL: CPT | Performed by: NURSE PRACTITIONER

## 2021-08-16 PROCEDURE — 82607 VITAMIN B-12: CPT | Performed by: NURSE PRACTITIONER

## 2021-08-16 PROCEDURE — 82306 VITAMIN D 25 HYDROXY: CPT | Performed by: NURSE PRACTITIONER

## 2021-08-16 PROCEDURE — 90834 PR PSYCHOTHERAPY W/PATIENT, 45 MIN: ICD-10-PCS | Mod: S$GLB,,, | Performed by: PSYCHOLOGIST

## 2021-08-16 PROCEDURE — 36415 COLL VENOUS BLD VENIPUNCTURE: CPT | Performed by: NURSE PRACTITIONER

## 2021-08-17 ENCOUNTER — OFFICE VISIT (OUTPATIENT)
Dept: PSYCHIATRY | Facility: CLINIC | Age: 21
End: 2021-08-17
Payer: COMMERCIAL

## 2021-08-17 DIAGNOSIS — F41.1 GAD (GENERALIZED ANXIETY DISORDER): ICD-10-CM

## 2021-08-17 DIAGNOSIS — F43.10 PTSD (POST-TRAUMATIC STRESS DISORDER): Primary | ICD-10-CM

## 2021-08-17 DIAGNOSIS — F33.2 MDD (MAJOR DEPRESSIVE DISORDER), RECURRENT SEVERE, WITHOUT PSYCHOSIS: ICD-10-CM

## 2021-08-17 PROCEDURE — 90834 PR PSYCHOTHERAPY W/PATIENT, 45 MIN: ICD-10-PCS | Mod: S$GLB,,, | Performed by: SOCIAL WORKER

## 2021-08-17 PROCEDURE — 90834 PSYTX W PT 45 MINUTES: CPT | Mod: S$GLB,,, | Performed by: SOCIAL WORKER

## 2021-08-20 ENCOUNTER — TELEPHONE (OUTPATIENT)
Dept: REHABILITATION | Facility: HOSPITAL | Age: 21
End: 2021-08-20

## 2021-08-23 ENCOUNTER — OFFICE VISIT (OUTPATIENT)
Dept: PSYCHIATRY | Facility: CLINIC | Age: 21
End: 2021-08-23
Payer: COMMERCIAL

## 2021-08-23 DIAGNOSIS — F43.10 PTSD (POST-TRAUMATIC STRESS DISORDER): Primary | ICD-10-CM

## 2021-08-23 PROCEDURE — 90834 PSYTX W PT 45 MINUTES: CPT | Mod: S$GLB,,, | Performed by: PSYCHOLOGIST

## 2021-08-23 PROCEDURE — 90834 PR PSYCHOTHERAPY W/PATIENT, 45 MIN: ICD-10-PCS | Mod: S$GLB,,, | Performed by: PSYCHOLOGIST

## 2021-08-26 ENCOUNTER — DOCUMENTATION ONLY (OUTPATIENT)
Dept: REHABILITATION | Facility: HOSPITAL | Age: 21
End: 2021-08-26

## 2021-09-08 ENCOUNTER — PATIENT MESSAGE (OUTPATIENT)
Dept: PSYCHIATRY | Facility: CLINIC | Age: 21
End: 2021-09-08

## 2021-09-14 ENCOUNTER — TELEPHONE (OUTPATIENT)
Dept: REHABILITATION | Facility: HOSPITAL | Age: 21
End: 2021-09-14

## 2021-09-15 ENCOUNTER — PATIENT MESSAGE (OUTPATIENT)
Dept: PSYCHIATRY | Facility: CLINIC | Age: 21
End: 2021-09-15

## 2021-09-15 ENCOUNTER — OFFICE VISIT (OUTPATIENT)
Dept: PSYCHIATRY | Facility: CLINIC | Age: 21
End: 2021-09-15
Payer: COMMERCIAL

## 2021-09-15 DIAGNOSIS — F43.10 PTSD (POST-TRAUMATIC STRESS DISORDER): Primary | ICD-10-CM

## 2021-09-15 PROCEDURE — 90832 PR PSYCHOTHERAPY W/PATIENT, 30 MIN: ICD-10-PCS | Mod: 95,,, | Performed by: PSYCHOLOGIST

## 2021-09-15 PROCEDURE — 90832 PSYTX W PT 30 MINUTES: CPT | Mod: 95,,, | Performed by: PSYCHOLOGIST

## 2021-09-21 ENCOUNTER — OFFICE VISIT (OUTPATIENT)
Dept: OBSTETRICS AND GYNECOLOGY | Facility: CLINIC | Age: 21
End: 2021-09-21
Payer: COMMERCIAL

## 2021-09-21 VITALS
SYSTOLIC BLOOD PRESSURE: 122 MMHG | WEIGHT: 182.31 LBS | BODY MASS INDEX: 28.56 KG/M2 | DIASTOLIC BLOOD PRESSURE: 82 MMHG

## 2021-09-21 DIAGNOSIS — N89.8 VAGINAL DISCHARGE: Primary | ICD-10-CM

## 2021-09-21 DIAGNOSIS — R10.2 PELVIC PAIN: ICD-10-CM

## 2021-09-21 LAB
B-HCG UR QL: NEGATIVE
BILIRUB SERPL-MCNC: NEGATIVE MG/DL
BLOOD URINE, POC: 50
CLARITY, POC UA: CLEAR
COLOR, POC UA: YELLOW
CTP QC/QA: YES
GLUCOSE UR QL STRIP: NORMAL
KETONES UR QL STRIP: NEGATIVE
LEUKOCYTE ESTERASE URINE, POC: ABNORMAL
NITRITE, POC UA: NEGATIVE
PH, POC UA: 5
PROTEIN, POC: ABNORMAL
SPECIFIC GRAVITY, POC UA: 1.02
UROBILINOGEN, POC UA: NORMAL

## 2021-09-21 PROCEDURE — 87591 N.GONORRHOEAE DNA AMP PROB: CPT | Mod: 59 | Performed by: PHYSICIAN ASSISTANT

## 2021-09-21 PROCEDURE — 87491 CHLMYD TRACH DNA AMP PROBE: CPT | Mod: 59 | Performed by: PHYSICIAN ASSISTANT

## 2021-09-21 PROCEDURE — 81025 URINE PREGNANCY TEST: CPT | Mod: S$GLB,,, | Performed by: PHYSICIAN ASSISTANT

## 2021-09-21 PROCEDURE — 81002 POCT URINE DIPSTICK WITHOUT MICROSCOPE: ICD-10-PCS | Mod: S$GLB,,, | Performed by: PHYSICIAN ASSISTANT

## 2021-09-21 PROCEDURE — 87481 CANDIDA DNA AMP PROBE: CPT | Mod: 59 | Performed by: PHYSICIAN ASSISTANT

## 2021-09-21 PROCEDURE — 99214 OFFICE O/P EST MOD 30 MIN: CPT | Mod: 25,S$GLB,, | Performed by: PHYSICIAN ASSISTANT

## 2021-09-21 PROCEDURE — 99999 PR PBB SHADOW E&M-EST. PATIENT-LVL III: CPT | Mod: PBBFAC,,, | Performed by: PHYSICIAN ASSISTANT

## 2021-09-21 PROCEDURE — 87661 TRICHOMONAS VAGINALIS AMPLIF: CPT | Mod: 59 | Performed by: PHYSICIAN ASSISTANT

## 2021-09-21 PROCEDURE — 99999 PR PBB SHADOW E&M-EST. PATIENT-LVL III: ICD-10-PCS | Mod: PBBFAC,,, | Performed by: PHYSICIAN ASSISTANT

## 2021-09-21 PROCEDURE — 99214 PR OFFICE/OUTPT VISIT, EST, LEVL IV, 30-39 MIN: ICD-10-PCS | Mod: 25,S$GLB,, | Performed by: PHYSICIAN ASSISTANT

## 2021-09-21 PROCEDURE — 81002 URINALYSIS NONAUTO W/O SCOPE: CPT | Mod: S$GLB,,, | Performed by: PHYSICIAN ASSISTANT

## 2021-09-21 PROCEDURE — 87086 URINE CULTURE/COLONY COUNT: CPT | Performed by: PHYSICIAN ASSISTANT

## 2021-09-21 PROCEDURE — 81025 POCT URINE PREGNANCY: ICD-10-PCS | Mod: S$GLB,,, | Performed by: PHYSICIAN ASSISTANT

## 2021-09-21 RX ORDER — NITROFURANTOIN 25; 75 MG/1; MG/1
100 CAPSULE ORAL 2 TIMES DAILY
Qty: 10 CAPSULE | Refills: 0 | Status: SHIPPED | OUTPATIENT
Start: 2021-09-21 | End: 2021-09-26

## 2021-09-21 RX ORDER — FLUCONAZOLE 150 MG/1
150 TABLET ORAL DAILY
Qty: 1 TABLET | Refills: 0 | Status: SHIPPED | OUTPATIENT
Start: 2021-09-21 | End: 2021-09-22

## 2021-09-21 RX ORDER — CLOTRIMAZOLE 1 %
CREAM (GRAM) TOPICAL
Qty: 30 G | Refills: 1 | Status: SHIPPED | OUTPATIENT
Start: 2021-09-21 | End: 2022-09-21

## 2021-09-23 LAB
BACTERIA UR CULT: NO GROWTH
C TRACH DNA SPEC QL NAA+PROBE: NOT DETECTED
N GONORRHOEA DNA SPEC QL NAA+PROBE: NOT DETECTED

## 2021-09-27 LAB
BACTERIAL VAGINOSIS DNA: NEGATIVE
CANDIDA GLABRATA DNA: NEGATIVE
CANDIDA KRUSEI DNA: NEGATIVE
CANDIDA RRNA VAG QL PROBE: POSITIVE
T VAGINALIS RRNA GENITAL QL PROBE: NEGATIVE

## 2021-09-28 ENCOUNTER — CLINICAL SUPPORT (OUTPATIENT)
Dept: REHABILITATION | Facility: HOSPITAL | Age: 21
End: 2021-09-28
Payer: COMMERCIAL

## 2021-09-28 DIAGNOSIS — M54.2 NECK PAIN, ACUTE: ICD-10-CM

## 2021-09-28 DIAGNOSIS — H55.81 DEFICIENT SACCADIC EYE MOVEMENTS: ICD-10-CM

## 2021-09-28 DIAGNOSIS — G44.86 CERVICOGENIC HEADACHE: ICD-10-CM

## 2021-09-28 DIAGNOSIS — H55.82 DEFICIENT SMOOTH PURSUIT EYE MOVEMENTS: ICD-10-CM

## 2021-09-28 DIAGNOSIS — H51.11 CONVERGENCE INSUFFICIENCY: ICD-10-CM

## 2021-09-28 PROCEDURE — 97110 THERAPEUTIC EXERCISES: CPT | Mod: PO

## 2021-09-29 ENCOUNTER — DOCUMENTATION ONLY (OUTPATIENT)
Dept: REHABILITATION | Facility: HOSPITAL | Age: 21
End: 2021-09-29

## 2021-12-18 ENCOUNTER — HOSPITAL ENCOUNTER (EMERGENCY)
Facility: OTHER | Age: 21
Discharge: HOME | End: 2021-12-18
Attending: EMERGENCY MEDICINE
Payer: COMMERCIAL

## 2021-12-18 VITALS
BODY MASS INDEX: 30.61 KG/M2 | HEART RATE: 87 BPM | TEMPERATURE: 99 F | SYSTOLIC BLOOD PRESSURE: 128 MMHG | RESPIRATION RATE: 16 BRPM | HEIGHT: 67 IN | OXYGEN SATURATION: 98 % | DIASTOLIC BLOOD PRESSURE: 78 MMHG | WEIGHT: 195 LBS

## 2021-12-18 DIAGNOSIS — U07.1 LAB TEST POSITIVE FOR DETECTION OF COVID-19 VIRUS: Primary | ICD-10-CM

## 2021-12-18 DIAGNOSIS — B34.9 VIRAL SYNDROME: ICD-10-CM

## 2021-12-18 DIAGNOSIS — R06.02 SOB (SHORTNESS OF BREATH): ICD-10-CM

## 2021-12-18 LAB
CTP QC/QA: YES
SARS-COV-2 RDRP RESP QL NAA+PROBE: POSITIVE

## 2021-12-18 PROCEDURE — 99285 EMERGENCY DEPT VISIT HI MDM: CPT | Mod: 25

## 2021-12-18 PROCEDURE — U0002 COVID-19 LAB TEST NON-CDC: HCPCS | Performed by: EMERGENCY MEDICINE

## 2021-12-18 PROCEDURE — 93005 ELECTROCARDIOGRAM TRACING: CPT

## 2021-12-18 RX ORDER — AZITHROMYCIN 250 MG/1
TABLET, FILM COATED ORAL
Qty: 6 TABLET | Refills: 0 | Status: SHIPPED | OUTPATIENT
Start: 2021-12-18

## 2021-12-18 RX ORDER — NAPROXEN 500 MG/1
500 TABLET ORAL 2 TIMES DAILY WITH MEALS
Qty: 20 TABLET | Refills: 0 | Status: SHIPPED | OUTPATIENT
Start: 2021-12-18

## 2021-12-18 RX ORDER — PROMETHAZINE HYDROCHLORIDE 25 MG/1
25 TABLET ORAL EVERY 6 HOURS PRN
Qty: 25 TABLET | Refills: 0 | Status: SHIPPED | OUTPATIENT
Start: 2021-12-18

## 2021-12-18 RX ORDER — ONDANSETRON 4 MG/1
4 TABLET, ORALLY DISINTEGRATING ORAL EVERY 6 HOURS PRN
Qty: 20 TABLET | Refills: 0 | Status: SHIPPED | OUTPATIENT
Start: 2021-12-18

## 2021-12-18 RX ORDER — BENZONATATE 100 MG/1
100 CAPSULE ORAL 3 TIMES DAILY PRN
Qty: 30 CAPSULE | Refills: 0 | Status: SHIPPED | OUTPATIENT
Start: 2021-12-18 | End: 2021-12-28

## 2022-03-16 ENCOUNTER — TELEPHONE (OUTPATIENT)
Dept: OPHTHALMOLOGY | Facility: CLINIC | Age: 22
End: 2022-03-16
Payer: COMMERCIAL

## 2022-03-16 ENCOUNTER — HOSPITAL ENCOUNTER (EMERGENCY)
Facility: HOSPITAL | Age: 22
Discharge: HOME | End: 2022-03-17
Attending: EMERGENCY MEDICINE
Payer: COMMERCIAL

## 2022-03-16 VITALS
SYSTOLIC BLOOD PRESSURE: 132 MMHG | WEIGHT: 195 LBS | TEMPERATURE: 99 F | BODY MASS INDEX: 30.54 KG/M2 | DIASTOLIC BLOOD PRESSURE: 70 MMHG | HEART RATE: 67 BPM | RESPIRATION RATE: 18 BRPM | OXYGEN SATURATION: 100 %

## 2022-03-16 DIAGNOSIS — H53.8 BLURRY VISION, BILATERAL: Primary | ICD-10-CM

## 2022-03-16 DIAGNOSIS — E23.6 PITUITARY CYST: ICD-10-CM

## 2022-03-16 PROCEDURE — 99284 EMERGENCY DEPT VISIT MOD MDM: CPT | Mod: ,,, | Performed by: PHYSICIAN ASSISTANT

## 2022-03-16 PROCEDURE — 99284 PR EMERGENCY DEPT VISIT,LEVEL IV: ICD-10-PCS | Mod: ,,, | Performed by: PHYSICIAN ASSISTANT

## 2022-03-16 PROCEDURE — 25000003 PHARM REV CODE 250: Performed by: PHYSICIAN ASSISTANT

## 2022-03-16 PROCEDURE — 99285 EMERGENCY DEPT VISIT HI MDM: CPT | Mod: 25

## 2022-03-16 RX ORDER — PROPARACAINE HYDROCHLORIDE 5 MG/ML
1 SOLUTION/ DROPS OPHTHALMIC
Status: COMPLETED | OUTPATIENT
Start: 2022-03-16 | End: 2022-03-16

## 2022-03-16 RX ORDER — LATANOPROST 50 UG/ML
1 SOLUTION/ DROPS OPHTHALMIC NIGHTLY
COMMUNITY
Start: 2022-01-11

## 2022-03-16 RX ORDER — GADOBUTROL 604.72 MG/ML
5 INJECTION INTRAVENOUS
Status: COMPLETED | OUTPATIENT
Start: 2022-03-17 | End: 2022-03-16

## 2022-03-16 RX ADMIN — PROPARACAINE HYDROCHLORIDE 1 DROP: 5 SOLUTION/ DROPS OPHTHALMIC at 06:03

## 2022-03-16 RX ADMIN — FLUORESCEIN SODIUM 1 EACH: 1 STRIP OPHTHALMIC at 06:03

## 2022-03-16 RX ADMIN — GADOBUTROL 5 ML: 604.72 INJECTION INTRAVENOUS at 11:03

## 2022-03-16 NOTE — TELEPHONE ENCOUNTER
Pt has an appointment with Dr Andersen she wants to be seen today. She states she has cataracts and recently dx with glaucoma. Has been having some vision changes for over a week or more. I offered an appointment with specialist pt. Declined will go to e.r.

## 2022-03-16 NOTE — ED PROVIDER NOTES
"Encounter Date: 3/16/2022       History     Chief Complaint   Patient presents with    Eye Problem     Recently dx. With glaucoma. States she has been getting "splotches of color" in her vision X1 week.        22-year-old female with history of anxiety, depression, migraines, seizure disorder, glaucoma, pituitary cyst who presents emergency department for colored spots in her vision.  Patient states that for the past week and a half she has been having colored spots in both eyes but worse in the right eye.  States that she sees different colors like red, green, yellow, blue which turn into splotches that floats in her vision.  Patient denies any flashers, curtain coming down in her vision, eye pain, headaches nausea vomiting or dizziness.  Patient states that she has a history of a pituitary cyst for 2 years for which she gets MRIs done in [place] to monitor states her last 1 was in December which did not show any change in size.        Review of patient's allergies indicates:  No Known Allergies  Past Medical History:   Diagnosis Date    Anxiety     Depression     Seizures     epilepsy at 14-16    Sleep difficulties     Suicide attempt      Past Surgical History:   Procedure Laterality Date    BREAST RECONSTRUCTION  12/2020    BREAST SURGERY      reduction 12/2020     Family History   Problem Relation Age of Onset    Hypertension Paternal Grandmother     Hyperlipidemia Paternal Grandmother     Heart failure Paternal Grandmother     Heart attack Paternal Grandmother     Stomach cancer Maternal Grandmother     Hypertension Maternal Grandmother     Diabetes Maternal Grandmother     Hypertension Father     Diabetes Father     Heart failure Father     Heart attack Father     Hypertension Mother      Social History     Tobacco Use    Smoking status: Never Smoker    Smokeless tobacco: Never Used   Substance Use Topics    Alcohol use: Yes     Comment: once every few months     Drug use: Never "     Review of Systems   Constitutional: Negative for chills and fever.   HENT: Negative for sore throat.    Eyes: Positive for visual disturbance. Negative for photophobia, pain and discharge.   Respiratory: Negative for cough and shortness of breath.    Cardiovascular: Negative for chest pain.   Gastrointestinal: Negative for abdominal pain.   Genitourinary: Negative for difficulty urinating and dysuria.   Musculoskeletal: Negative.    Skin: Negative.    Allergic/Immunologic: Negative for immunocompromised state.   Neurological: Positive for headaches. Negative for weakness.   Hematological: Negative for adenopathy.   Psychiatric/Behavioral: Negative for confusion.       Physical Exam     Initial Vitals [03/16/22 1616]   BP Pulse Resp Temp SpO2   (!) 140/86 88 18 99.2 °F (37.3 °C) 100 %      MAP       --         Physical Exam    Nursing note and vitals reviewed.  Constitutional: She appears well-developed and well-nourished.   HENT:   Head: Normocephalic and atraumatic.   Eyes: Conjunctivae and EOM are normal. Pupils are equal, round, and reactive to light.   Neck: Neck supple.   Normal range of motion.  Cardiovascular: Normal rate, regular rhythm, normal heart sounds and intact distal pulses. Exam reveals no gallop and no friction rub.    No murmur heard.  Pulmonary/Chest: Breath sounds normal.   Abdominal: Abdomen is soft. Bowel sounds are normal. She exhibits no distension and no mass. There is no abdominal tenderness. There is no rebound and no guarding.   Musculoskeletal:         General: Normal range of motion.      Cervical back: Normal range of motion and neck supple.     Neurological: She is alert and oriented to person, place, and time. She has normal strength. No cranial nerve deficit or sensory deficit. GCS score is 15. GCS eye subscore is 4. GCS verbal subscore is 5. GCS motor subscore is 6.   Facial features symmetric, no facial droop, no dysarthria, negative pronator drift.  V1 3 V3 sensory intact.   Finger to nose and heel-to-shin test normal.  Ambulatory with normal gait.   Skin: Skin is warm and dry. Capillary refill takes less than 2 seconds.   Psychiatric: She has a normal mood and affect.         ED Course   Procedures  Labs Reviewed - No data to display       Imaging Results          MRI Brain Pituitary W WO Contrast (In process)  Result time 03/16/22 23:39:55                 Medications   proparacaine 0.5 % ophthalmic solution 1 drop (1 drop Both Eyes Given by Other 3/16/22 1830)   fluorescein ophthalmic strip 1 each (1 each Both Eyes Given by Other 3/16/22 1830)   gadobutroL (GADAVIST) injection 5 mL (5 mLs Intravenous Given 3/16/22 8512)     Medical Decision Making:   History:   Old Medical Records: I decided to obtain old medical records.  Clinical Tests:   Radiological Study: Ordered and Reviewed  Other:   I have discussed this case with another health care provider.       APC / Resident Notes:   22 y.o. year old female presenting with colored spots in her vision.  On exam patient is afebrile and nontoxic.  Heart rate and rhythm are regular. Lungs with clear breath sounds throughout. Abdomen is soft, nontender. No edema.  No focal neurological deficits    DDx includes but is not limited to pituitary mass, keratoconus, glaucoma    ED workup reveals ophthalmology was consulted and for dilated eye exam recommends follow-up outpatient in clinic on Friday.  States that could be progression of keratoconus and doubts that this is due to glaucoma.  Patient does have history of pituitary cyst which is monitored in [place] and her last MRI was in December.  Discussed case with Neurosurgery will get MRI brain and pituitary protocol with and without.  If MRI is normal patient can follow-up with ophthalmology outpatient.  Patient care in enough given the ED team at discharge who will follow-up with imaging and neurosurgery recs.    Discussed findings and plan with patient who verbalized understanding and agrees  with the plan and course of treatment. Return to ED precautions discussed. Patient is stable for discharge. I discussed the care of this patient with my supervising physician.                   Clinical Impression:   Final diagnoses:  [H53.8] Blurry vision, bilateral (Primary)                 Pavan Stevenson PA-C  03/16/22 2629

## 2022-03-16 NOTE — ED NOTES
Patient identifiers verified and correct for Ashok Cabello    Pt to ED with c/o color spots to bilateral eyes x1 week, states it is worse in R eye. Recently dx with glaucoma (January). Denies any eye trauma or falls recently. Pt states color sports make it difficult to focus, drive, read, was instructed to come to ER by pcp. Denies eye pain    LOC: The patient is awake, alert, and aware of environment. The patient is AOX4 and speaking appropriately.   APPEARANCE: No acute distress noted. Pt reports pain of   HEENT: WDL  PSYCHOSOCIAL: Patient is calm and cooperative. Denies SI/HI.  SKIN: The skin is warm, dry, color consistent with ethnicity. No breakdown or brusing visible.  RESPIRATORY: Airway is open and patent. Bilateral chest rise and fall. Respiratory rate even and unlabored.  No accessory muscle use noted.  CARDIAC: Patient has a normal rate and rhythm. No complaints of chest pain.  ABDOMEN/GI: Soft, non tender. No distention noted. Denies n/v/d.   URINARY:  Voids independently without difficulty. No complaints of frequency, urgency, burning, or blood in urine.   NEUROLOGIC: Eyes open spontaneously. Speech clear.  Able to follow commands, demonstrating ability to actively and appropriately communicate within context of current conversation. Symmetrical facial muscles. Movement is purposeful. Denies dizziness/lightheadedness.  MUSCULOSKELETAL: No obvious deformities noted. Full ROM in all extremities.  PERIPHERAL VASCULAR: Cap refill <3 secs bilaterally. No peripheral edema noted. Denies numbness and tingling in extremities.

## 2022-03-17 PROCEDURE — A9585 GADOBUTROL INJECTION: HCPCS | Performed by: EMERGENCY MEDICINE

## 2022-03-17 PROCEDURE — 25500020 PHARM REV CODE 255: Performed by: EMERGENCY MEDICINE

## 2022-03-17 RX ORDER — SUMATRIPTAN 50 MG/1
50 TABLET, FILM COATED ORAL
Qty: 20 TABLET | Refills: 1 | Status: SHIPPED | OUTPATIENT
Start: 2022-03-17 | End: 2022-03-17 | Stop reason: SDUPTHER

## 2022-03-17 RX ORDER — SUMATRIPTAN 50 MG/1
50 TABLET, FILM COATED ORAL
Qty: 20 TABLET | Refills: 1 | Status: SHIPPED | OUTPATIENT
Start: 2022-03-17 | End: 2022-04-16

## 2022-03-17 NOTE — CONSULTS
Consultation Report  Ophthalmology Service    Date: 03/16/2022    Chief complaint/Reason for Consult: blurry vision     History of Present Illness: Ashok Cabello is a 22 y.o. female who presents with visual obscurations x 1 week. Pt seeing splotches of color in her eyes x 1 week. Does have headaches, but no changes in them and color splotches not associated. Endorses slight decrease in VA. Denies flashes, floaters, curtains/veils to eye.    POcularHx: keratoconus, POAG    Current eye gtts: latanoprost QHS    PMHx:  has a past medical history of Anxiety, Depression, Seizures, Sleep difficulties, and Suicide attempt.     PSurgHx:  has a past surgical history that includes Breast surgery and Breast reconstruction (12/2020).     Home Medications:   Prior to Admission medications    Medication Sig Start Date End Date Taking? Authorizing Provider   azithromycin (ZITHROMAX Z-TERESITA) 250 MG tablet Take as directed on package 12/18/21   Won Way MD   clotrimazole (LOTRIMIN) 1 % cream Apply to affected area 2 times daily 9/21/21 9/21/22  Melissa Salcedo PA-C   latanoprost 0.005 % ophthalmic solution Place 1 drop into both eyes nightly. 1/11/22   Historical Provider   naproxen (NAPROSYN) 500 MG tablet Take 1 tablet (500 mg total) by mouth 2 (two) times daily with meals. For pain 12/18/21   Won Way MD   ondansetron (ZOFRAN-ODT) 4 MG TbDL Take 1 tablet (4 mg total) by mouth every 6 (six) hours as needed (Nausea and vomiting). 12/18/21   Won Way MD   promethazine (PHENERGAN) 25 MG tablet Take 1 tablet (25 mg total) by mouth every 6 (six) hours as needed for Nausea. 12/18/21   Won Way MD   sertraline (ZOLOFT) 50 MG tablet TAKE 1 TABLET BY MOUTH EVERY DAY 8/25/21   Shahrzad Tatum NP   traZODone (DESYREL) 50 MG tablet TAKE 1 TABLET (50 MG TOTAL) BY MOUTH EVERY EVENING. 8/25/21 8/25/22  Shahrzad M. Lafont, NP        Medications this encounter:     Allergies: has No Known Allergies.     Social:   reports that she has never smoked. She has never used smokeless tobacco. She reports current alcohol use. She reports that she does not use drugs.     Family Hx: No family history of glaucoma, macular degeneration, or blindness. family history includes Diabetes in her father and maternal grandmother; Heart attack in her father and paternal grandmother; Heart failure in her father and paternal grandmother; Hyperlipidemia in her paternal grandmother; Hypertension in her father, maternal grandmother, mother, and paternal grandmother; Stomach cancer in her maternal grandmother.     ROS: Negative x 10 except for complaints as described in HPI; negative for fever, chills, weight loss, nausea, vomiting, diarrhea, shortness of breath, nasal discharge, cough, abdominal pain, dyspnea, difficulty moving arms and legs, confusion, dysuria, palpitations, or chest pain     Ocular examination/Dilated fundus examination:  Base Eye Exam     Visual Acuity (Snellen - Linear)       Right Left    Dist cc 20/40 20/25          Tonometry (Applanation, 7:40 PM)       Right Left    Pressure 15 15          Pupils       Dark Light Shape React APD    Right 3 2 Round Brisk None    Left 3 2 Round Brisk None          Visual Fields       Right Left     Full Full          Extraocular Movement       Right Left     Full, Ortho Full, Ortho            Slit Lamp and Fundus Exam     External Exam       Right Left    External Normal Normal          Slit Lamp Exam       Right Left    Lids/Lashes Normal Normal    Conjunctiva/Sclera White and quiet White and quiet    Cornea enlarged nerves, inf PEEs enlarged nerves, PEEs, stromal scar inf    Anterior Chamber Deep and quiet Deep and quiet    Iris Round and reactive Round and reactive    Lens Clear Clear    Anterior Vitreous Normal Normal          Fundus Exam       Right Left    Disc Normal Normal    C/D Ratio 0.6 0.6    Macula Normal Normal    Vessels Normal Normal    Periphery Normal Normal                 Assessment/Plan:   1. Blurry Vision  - OD > OS  - pt w POH of keratoconus and POAG  - changes unlikely related to POAG  - change in VA could be related to progression of keratconus  - anterior and posterior exam wnl  - will bring to clinic for OCT and topography if possible    Recs  - no acute intervention  - will f/u in clinic  - consider repeat CT Head 2/2 hx of pituitary cyst    If there are further questions, please page the on call ophthalmology resident.    Chad Gee MD  PGY2, Ophthalmology Resident  03/16/2022  7:41 PM

## 2022-03-17 NOTE — PROVIDER PROGRESS NOTES - EMERGENCY DEPT.
"Signout Note    I received signout from the previous provider.     Chief complaint:  Eye Problem (Recently dx. With glaucoma. States she has been getting "splotches of color" in her vision X1 week. /)      Pertinent history &exam:  Ashok Cabello is a 22 y.o. female who presented to emergency department with complaint of vision abnormalities.    RT seen by Ophthalmology, no acute abnormality noted.  Chronic changes noted, recommending follow-up in their clinic.    Vitals:    03/16/22 2002   BP: 132/70   Pulse: 67   Resp: 18   Temp:        Imaging Studies:    MRI Brain Pituitary W WO Contrast   Final Result      No evidence of acute intracranial pathology.      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      Electronically signed by resident: Arnel Adame   Date:    03/16/2022   Time:    23:25      Electronically signed by: Andriy Anderson MD   Date:    03/16/2022   Time:    23:51          Medications Given:  Medications   proparacaine 0.5 % ophthalmic solution 1 drop (1 drop Both Eyes Given by Other 3/16/22 1830)   fluorescein ophthalmic strip 1 each (1 each Both Eyes Given by Other 3/16/22 1830)   gadobutroL (GADAVIST) injection 5 mL (5 mLs Intravenous Given 3/16/22 9767)       Pending Items/ MDM:  22 y.o. female with complaint of visual disturbance.  Patient seen by Ophthalmology, no acute abnormality noted.  Chronic changes were noted, and they are recommending follow up in clinic.    At time of sign-out, signed out pending MRI of the brain and pituitary for final disposition.    MRI shows a 3 mm pituitary lesion.  There is no surrounding vasogenic edema.    Patient reassessed, current asymptomatic.    Patient does note a history of migraines, typically does not have an aura that involves visual scotoma.  However, willing to try Imitrex moving forward as there is no clear alternative explanation for her complaint today.    She plans follow up in ophthalmology clinic.  " Discharged home in stable condition.    Diagnostic Impression:    1. Blurry vision, bilateral    2. Pituitary cyst         ED Disposition Condition    Discharge Stable        ED Prescriptions     Medication Sig Dispense Start Date End Date Auth. Provider    sumatriptan (IMITREX) 50 MG tablet  (Status: Discontinued) Take 1 tablet (50 mg total) by mouth every 2 (two) hours as needed for Migraine (50 to 100 mg as a single dose ([facility] [[place]ryl 2013]; [place] 2012). If symptoms persist or return, may repeat dose (usually same as first dose) after =2 hours. Maximum dose: 100 mg/dose; 200 mg per 24 hours.). 20 tablet 3/17/2022 3/17/2022 Ainsley Zaidi MD    sumatriptan (IMITREX) 50 MG tablet Take 1 tablet (50 mg total) by mouth every 2 (two) hours as needed for Migraine (50 to 100 mg as a single dose ([facility] [[place] 2013]; [place] 2012). If symptoms persist or return, may repeat dose (usually same as first dose) after =2 hours. Maximum dose: 100 mg/dose; 200 mg per 24 hours.). 20 tablet 3/17/2022 4/16/2022 Ainsley Zaidi MD        Follow-up Information     Follow up With Specialties Details Why Contact Info Additional Information    Your primary care doctor or whoever does surveillance of your brain mass  Schedule an appointment as soon as possible for a visit        [facility] - [address] [place] [address] Ophthalmology Schedule an appointment as soon as possible for a visit   [facility] [address]  [facility] [address] [phone] Please arrive on the 10th floor for check-in.          Ainsley Zaidi MD  Emergency Medicine

## 2022-03-17 NOTE — DISCHARGE INSTRUCTIONS
Tests today showed:   Labs Reviewed - No data to display  MRI Brain Pituitary W WO Contrast   Final Result      No evidence of acute intracranial pathology.      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      Electronically signed by resident: Arnel Adame   Date:    03/16/2022   Time:    23:25      Electronically signed by: Andriy Anderson MD   Date:    03/16/2022   Time:    23:51          Treatments you had today:   Medications   proparacaine 0.5 % ophthalmic solution 1 drop (1 drop Both Eyes Given by Other 3/16/22 1830)   fluorescein ophthalmic strip 1 each (1 each Both Eyes Given by Other 3/16/22 1830)   gadobutroL (GADAVIST) injection 5 mL (5 mLs Intravenous Given 3/16/22 2303)       Follow-Up Plan:  - Follow-up with primary care doctor within 3 - 5 days  - Additional testing and/or evaluation as directed by your primary doctor    Return to the Emergency Department for symptoms including but not limited to: worsening symptoms, shortness of breath or chest pain, vomiting with inability to hold down fluids, fevers greater than 100.4°F, passing out/fainting/unconsciousness, or other concerning symptoms.

## 2022-05-03 ENCOUNTER — PATIENT MESSAGE (OUTPATIENT)
Dept: RESEARCH | Facility: HOSPITAL | Age: 22
End: 2022-05-03
Payer: COMMERCIAL

## 2022-09-14 ENCOUNTER — LAB VISIT (OUTPATIENT)
Dept: LAB | Facility: HOSPITAL | Age: 22
End: 2022-09-14
Payer: COMMERCIAL

## 2022-09-14 ENCOUNTER — OFFICE VISIT (OUTPATIENT)
Dept: ALLERGY | Facility: CLINIC | Age: 22
End: 2022-09-14
Payer: COMMERCIAL

## 2022-09-14 VITALS — BODY MASS INDEX: 35.39 KG/M2 | OXYGEN SATURATION: 100 % | WEIGHT: 225.5 LBS | HEART RATE: 81 BPM | HEIGHT: 67 IN

## 2022-09-14 DIAGNOSIS — J31.0 CHRONIC RHINITIS: ICD-10-CM

## 2022-09-14 DIAGNOSIS — Z91.018 HX OF FOOD ALLERGY: ICD-10-CM

## 2022-09-14 DIAGNOSIS — J31.0 CHRONIC RHINITIS: Primary | ICD-10-CM

## 2022-09-14 PROCEDURE — 99999 PR PBB SHADOW E&M-EST. PATIENT-LVL III: ICD-10-PCS | Mod: PBBFAC,,, | Performed by: ALLERGY & IMMUNOLOGY

## 2022-09-14 PROCEDURE — 99204 OFFICE O/P NEW MOD 45 MIN: CPT | Mod: S$GLB,,, | Performed by: ALLERGY & IMMUNOLOGY

## 2022-09-14 PROCEDURE — 36415 COLL VENOUS BLD VENIPUNCTURE: CPT | Performed by: ALLERGY & IMMUNOLOGY

## 2022-09-14 PROCEDURE — 99204 PR OFFICE/OUTPT VISIT, NEW, LEVL IV, 45-59 MIN: ICD-10-PCS | Mod: S$GLB,,, | Performed by: ALLERGY & IMMUNOLOGY

## 2022-09-14 PROCEDURE — 99999 PR PBB SHADOW E&M-EST. PATIENT-LVL III: CPT | Mod: PBBFAC,,, | Performed by: ALLERGY & IMMUNOLOGY

## 2022-09-14 PROCEDURE — 86003 ALLG SPEC IGE CRUDE XTRC EA: CPT | Performed by: ALLERGY & IMMUNOLOGY

## 2022-09-14 PROCEDURE — 86003 ALLG SPEC IGE CRUDE XTRC EA: CPT | Mod: 59 | Performed by: ALLERGY & IMMUNOLOGY

## 2022-09-14 RX ORDER — EPINEPHRINE 0.3 MG/.3ML
1 INJECTION SUBCUTANEOUS ONCE
Qty: 2 EACH | Refills: 1 | Status: SHIPPED | OUTPATIENT
Start: 2022-09-14 | End: 2022-09-14

## 2022-09-14 RX ORDER — BRIMONIDINE TARTRATE 2 MG/ML
SOLUTION/ DROPS OPHTHALMIC
COMMUNITY
Start: 2022-09-12

## 2022-09-14 NOTE — PROGRESS NOTES
Subjective:       Patient ID: Ashok Cabello is a 22 y.o. female.    Chief Complaint:  Allergies (Eyes, watery, itchy and swollen, runny nose, cough) and Urticaria (Reaction to pineapple and parsley  )      HPI:   Patient's symptoms include clear rhinorrhea, cough, itchy eyes, nasal congestion, postnasal drip, swelling of eyes, and watery eyes. These symptoms are seasonal. Worse in summer, hot weather. Current triggers include exposure to fumes/strong odors. The patient has been suffering from these symptoms for approximately 9 months. The patient has tried  benadryl  with fair relief of symptoms. Immunotherapy has never been tried. The patient has never had nasal polyps. The patient has no history of asthma. The patient has no history of eczema. The patient does not suffer from frequent sinopulmonary infections. The patient has not had sinus surgery in the past.   Also w concern of food allergy--pineapple and parsley    In Dec '21 developed hives, itching, gums itching, lips burning while eating pineapple grown in her parents backyard in [place].  Prior to this tolerated pineapple often. It is her favorite fruit. While eating it, lips became itchy, itching became generalized, saw hives, felt warm. Relief w cold shower, benadryl, lotion. Took Benadryl x 2 days. No resp distress. + nausea.  Had pineapple again 2 weeks later. Same thing. Relief w benadryl.  On at least 2 subsequent occasions when accidentally had pineapple in a smoothie, had similar sx's.  The abovementioned allergic conjuncitivitis sx's started after these episodes.    A few weeks ago was cooking blackened fish and rice w parsley. While eating gums started itching, tingling, burning. Then neck itching, coughing scratching, hives on neck and chest, lips swollen. Worse than w pineapple.   Has tolerated fish, rice since then. Has been avoiding parsley since.  Itching lasted 3 days. Gums still tingling.    No latex allergy.     Environmental History:  Pets in the home: none.  Debbie: hardwood floors and [id number] carpeting  Tobacco Smoke in Home: no    Past Medical History:   Diagnosis Date    Allergy     Anxiety     Depression     Seizures     epilepsy at 15-16    Sleep difficulties     Suicide attempt     Urticaria          Family History   Problem Relation Age of Onset    Hypertension Paternal Grandmother     Hyperlipidemia Paternal Grandmother     Heart failure Paternal Grandmother     Heart attack Paternal Grandmother     Stomach cancer Maternal Grandmother     Hypertension Maternal Grandmother     Diabetes Maternal Grandmother     Hypertension Father     Diabetes Father     Heart failure Father     Heart attack Father     Hypertension Mother    No parental atopy      Review of Systems   Constitutional:  Negative for activity change, fatigue and fever.   HENT:  Negative for congestion, postnasal drip, rhinorrhea, sinus pressure and sneezing.    Eyes:  Negative for discharge, redness and itching.   Respiratory:  Negative for cough, shortness of breath and wheezing.    Cardiovascular:  Negative for chest pain.   Gastrointestinal:  Negative for diarrhea, nausea and vomiting.   Genitourinary:  Negative for dysuria.   Musculoskeletal:  Negative for arthralgias and joint swelling.   Skin:  Negative for rash.   Neurological:  Negative for headaches.   Hematological:  Does not bruise/bleed easily.   Psychiatric/Behavioral:  Negative for behavioral problems and sleep disturbance.         Objective:   Physical Exam        No results found for: IGGSERUM, IGM, IGA     No results found for: IGE    Eos #   Date Value Ref Range Status   08/16/2021 0.1 0.0 - 0.5 K/uL Final     Eosinophil %   Date Value Ref Range Status   08/16/2021 1.2 0.0 - 8.0 % Final           Assessment:       1. Chronic rhinitis    2. Hx of food allergy         Plan:       Ashok was seen today for allergies and urticaria.    Diagnoses and all orders for this visit:    Chronic rhinitis  -      Dermatophagoides [place]; Future  -     Dermatophagoides Pteronyssinus; Future  -     [place]; Future  -     [place]; Future  -     [place]; Future  -     English Plantain; Future  -     Oak; Future  -     Pecan; Future  -     M[place] Elder; Future  -     Ragweed; Future  -     Alternaria; Future  -     Aspergillus; Future  -     Cat; Future  -     Cockroach; Future  -     Dog; Future    Hx of food allergy  -     Pineapple IgE; Future  -     Allergen, Parsley; Future    Other orders  -     EPINEPHrine (EPIPEN 2-TERESITA) 0.3 mg/0.3 mL AtIn; Inject 0.3 mLs (0.3 mg total) into the muscle once. for 1 dose    Food allergy action plan    Zyrtec routinely    Fu pending results

## 2022-09-16 LAB
A ALTERNATA IGE QN: <0.1 KU/L
A FUMIGATUS IGE QN: <0.1 KU/L
BERMUDA GRASS IGE QN: <0.1 KU/L
CAT DANDER IGE QN: <0.1 KU/L
CEDAR IGE QN: <0.1 KU/L
D FARINAE IGE QN: <0.1 KU/L
D PTERONYSS IGE QN: <0.1 KU/L
DEPRECATED A ALTERNATA IGE RAST QL: NORMAL
DEPRECATED A FUMIGATUS IGE RAST QL: NORMAL
DEPRECATED BERMUDA GRASS IGE RAST QL: NORMAL
DEPRECATED CAT DANDER IGE RAST QL: NORMAL
DEPRECATED CEDAR IGE RAST QL: NORMAL
DEPRECATED D FARINAE IGE RAST QL: NORMAL
DEPRECATED D PTERONYSS IGE RAST QL: NORMAL
DEPRECATED DOG DANDER IGE RAST QL: NORMAL
DEPRECATED ELDER IGE RAST QL: NORMAL
DEPRECATED ENGL PLANTAIN IGE RAST QL: NORMAL
DEPRECATED PARSLEY IGE RAST QL: NORMAL
DEPRECATED PECAN/HICK TREE IGE RAST QL: NORMAL
DEPRECATED PINEAPPLE IGE RAST QL: NORMAL
DEPRECATED ROACH IGE RAST QL: NORMAL
DEPRECATED TIMOTHY IGE RAST QL: NORMAL
DEPRECATED WEST RAGWEED IGE RAST QL: NORMAL
DEPRECATED WHITE OAK IGE RAST QL: NORMAL
DOG DANDER IGE QN: <0.1 KU/L
ELDER IGE QN: <0.1 KU/L
ENGL PLANTAIN IGE QN: <0.1 KU/L
PARSLEY IGE QN: <0.1 KU/L
PECAN/HICK TREE IGE QN: <0.1 KU/L
PINEAPPLE IGE QN: <0.1 KU/L
ROACH IGE QN: <0.1 KU/L
TIMOTHY IGE QN: <0.1 KU/L
WEST RAGWEED IGE QN: <0.1 KU/L
WHITE OAK IGE QN: <0.1 KU/L

## 2023-02-28 ENCOUNTER — TELEPHONE (OUTPATIENT)
Dept: INTERNAL MEDICINE | Facility: CLINIC | Age: 23
End: 2023-02-28
Payer: COMMERCIAL

## 2023-02-28 NOTE — TELEPHONE ENCOUNTER
Tried to call pt, needs to reschedule appt on 03/03 with someone who is accepting new pts. No answer LVM

## 2023-03-03 ENCOUNTER — TELEPHONE (OUTPATIENT)
Dept: INTERNAL MEDICINE | Facility: CLINIC | Age: 23
End: 2023-03-03
Payer: COMMERCIAL

## 2023-03-03 NOTE — TELEPHONE ENCOUNTER
Called pt and helped reschedule with pcp accepting new pts. Appt sent to advance users for 03/29 10 am

## 2023-03-10 ENCOUNTER — OFFICE VISIT (OUTPATIENT)
Dept: OBSTETRICS AND GYNECOLOGY | Facility: CLINIC | Age: 23
End: 2023-03-10
Payer: COMMERCIAL

## 2023-03-10 VITALS
BODY MASS INDEX: 37.53 KG/M2 | SYSTOLIC BLOOD PRESSURE: 122 MMHG | WEIGHT: 239.63 LBS | DIASTOLIC BLOOD PRESSURE: 70 MMHG

## 2023-03-10 DIAGNOSIS — R10.2 PELVIC PAIN: ICD-10-CM

## 2023-03-10 DIAGNOSIS — B37.31 VAGINAL CANDIDA: ICD-10-CM

## 2023-03-10 DIAGNOSIS — Z12.4 CERVICAL CANCER SCREENING: Primary | ICD-10-CM

## 2023-03-10 DIAGNOSIS — Z30.09 OTHER GENERAL COUNSELING AND ADVICE FOR CONTRACEPTIVE MANAGEMENT: ICD-10-CM

## 2023-03-10 DIAGNOSIS — Z11.3 SCREENING FOR STD (SEXUALLY TRANSMITTED DISEASE): ICD-10-CM

## 2023-03-10 DIAGNOSIS — Z30.011 ENCOUNTER FOR INITIAL PRESCRIPTION OF CONTRACEPTIVE PILLS: ICD-10-CM

## 2023-03-10 DIAGNOSIS — E66.9 OBESITY (BMI 30-39.9): ICD-10-CM

## 2023-03-10 DIAGNOSIS — R30.0 DYSURIA: ICD-10-CM

## 2023-03-10 PROCEDURE — 81514 NFCT DS BV&VAGINITIS DNA ALG: CPT | Performed by: OBSTETRICS & GYNECOLOGY

## 2023-03-10 PROCEDURE — 99999 PR PBB SHADOW E&M-EST. PATIENT-LVL III: CPT | Mod: PBBFAC,,, | Performed by: OBSTETRICS & GYNECOLOGY

## 2023-03-10 PROCEDURE — 99395 PR PREVENTIVE VISIT,EST,18-39: ICD-10-PCS | Mod: S$GLB,,, | Performed by: OBSTETRICS & GYNECOLOGY

## 2023-03-10 PROCEDURE — 87591 N.GONORRHOEAE DNA AMP PROB: CPT | Performed by: OBSTETRICS & GYNECOLOGY

## 2023-03-10 PROCEDURE — 99395 PREV VISIT EST AGE 18-39: CPT | Mod: S$GLB,,, | Performed by: OBSTETRICS & GYNECOLOGY

## 2023-03-10 PROCEDURE — 88175 CYTOPATH C/V AUTO FLUID REDO: CPT | Performed by: OBSTETRICS & GYNECOLOGY

## 2023-03-10 PROCEDURE — 87086 URINE CULTURE/COLONY COUNT: CPT | Performed by: OBSTETRICS & GYNECOLOGY

## 2023-03-10 PROCEDURE — 99999 PR PBB SHADOW E&M-EST. PATIENT-LVL III: ICD-10-PCS | Mod: PBBFAC,,, | Performed by: OBSTETRICS & GYNECOLOGY

## 2023-03-10 RX ORDER — FLUCONAZOLE 150 MG/1
150 TABLET ORAL
Qty: 3 TABLET | Refills: 0 | Status: SHIPPED | OUTPATIENT
Start: 2023-03-10 | End: 2023-03-17

## 2023-03-10 NOTE — PROGRESS NOTES
Past medical, surgical, social, family, and obstetric histories; medications; prior records and results; and available outside records were reviewed and updated in the EMR.  Pertinent findings were noted below.    Reason for Visit   No chief complaint on file.    HPI   23 y.o. female  in school at [employer] and [employer], from [place], [place]    New patient: Aby, last seen by COLIN Salcedo    Menopausal: No    History of abnormal paps: DENIES  Abnormal or postmenopausal bleeding: DENIES  History of abnormal mammograms:N/A   Family history of breast or ovarian cancer: DENIES  Any breast masses, pain, skin changes, or nipple discharge: DENIES, has had a breast reduction  Possible recent STD exposure: sexually active with male, no new partners in last 6 months  Contraception:  coitus interruptus , does not desire pregnancy    Vaginal pain after intercourse, concerned about vaginal tearing, doesn't use lubricant; has recurrent issues with BV and/or yeast; uses boric acid PRN and shaves vulva  C/o burning with urination   History of right sided ovarian cyst that was follow on ultrasound in past, hasn't had ultrasound since moving to [facility]  Does not desire pregnancy and is only rarely sexually active; used vaginal ring in past for contraception but did not like    Pap: 3/10/2023, Done today  Mammogram: N/A  Allergies: Parsley (petroselinum sp) and Pineapple    Review of Systems   Constitutional:  Negative for chills and fever.   Eyes:  Negative for visual disturbance.   Respiratory:  Negative for cough and shortness of breath.    Cardiovascular:  Negative for chest pain and leg swelling.   Gastrointestinal:  Negative for abdominal pain, nausea and vomiting.   Genitourinary:  Positive for dysuria, vaginal discharge and vaginal pain. Negative for flank pain, frequency, urgency and vaginal bleeding.   Integumentary:  Negative for breast mass.   Neurological:  Negative for syncope and headaches.   Psychiatric/Behavioral:  Negative for  depression. The patient is not nervous/anxious.    All other systems reviewed and are negative.  Breast: Negative for mass and mastodynia    Exam   /70   Wt 108.7 kg (239 lb 10.2 oz)   BMI 37.53 kg/m²     Physical Exam  Constitutional:       General: She is not in acute distress.     Appearance: She is not toxic-appearing.   Genitourinary:      Vulva normal.      Right Labia: No rash, lesions or Bartholin's cyst.     Left Labia: No lesions, Bartholin's cyst or rash.     Vaginal discharge (clumpy, thick white discharge adherent to vaginal walls) present.      No vaginal bleeding.        Right Adnexa: not tender and not full.     Left Adnexa: not tender and not full.     No cervical motion tenderness, discharge or lesion.      Uterus is not enlarged or tender.      Pelvic exam was performed with patient in the lithotomy position.   Breasts:     Breasts are symmetrical.      Right: No mass, nipple discharge, skin change or tenderness.      Left: No mass, nipple discharge, skin change or tenderness.   Pulmonary:      Effort: No respiratory distress.   Abdominal:      General: There is no distension.      Palpations: Abdomen is soft. There is no mass.      Tenderness: There is no abdominal tenderness. There is no guarding or rebound.   Lymphadenopathy:      Upper Body:      Right upper body: No axillary adenopathy.      Left upper body: No axillary adenopathy.   Exam conducted with a chaperone present.     Cervical cancer screening  -     Liquid-Based Pap Smear, Screening    Screening for STD (sexually transmitted disease)  -     Vaginosis Screen by DNA Probe  -     C. trachomatis/N. gonorrhoeae by AMP DNA [facility]; Cervicovaginal    Dysuria  -     Urine culture    Other general counseling and advice for contraceptive management    Obesity (BMI 30-39.9)  -     Cancel: HEMOGLOBIN A1C; Future; Expected date: 03/10/2023  -     Cancel: HEMOGLOBIN A1C; Future; Expected date: 03/10/2023  -     HEMOGLOBIN A1C; Future;  Expected date: 03/10/2023    Pelvic pain  -     US Pelvis Comp with Transvag NON-OB (xpd; Future; Expected date: 03/10/2023    Encounter for initial prescription of contraceptive pills  -     ulipristaL (BHAVNA) 30 mg tablet; Take 1 tablet (30 mg total) by mouth once. for 1 dose  Dispense: 1 tablet; Refill: 0    Vaginal candida  -     fluconazole (DIFLUCAN) 150 MG Tab; Take 1 tablet (150 mg total) by mouth Every 3 (three) days. for 3 doses  Dispense: 3 tablet; Refill: 0      Annual exam  Breast and pelvic exam: performed today, see above findings. No vaginal tearing noted, only severe candidal infection.  Patient was counseled on ASCCP guidelines for cervical cytology screening  Cervical screening: cytology sent  Patient was counseled on current recommendations for breast cancer screening  Mammogram screening: @ 41yo unless new risk factors warrant earlier screening  VitD/Ca supplementation recommendations based on age:  <50yoa - Ca 1000mg/day, VitD 600IU/day  51-70yoa - Ca 1200mg/day, VitD 600IU/day  >71yoa - Ca 1200mg/day, VitD 800IU/day  STD testing: desires swabs only (sent)  Contraception: all methods discussed and handout given  Vulvar care guidelines discussed and methods to keep the normal vaginal pH so to prevent BV/yeast. Given recurrent yeast infection and strong family history, will check HbA1C to rule out diabetes as cause of recurrence.   Ucx, affirm sent  Counseled on condom use. Rx for Bhavna provided while patient is considering contraceptive options as Plan B isn't effective in her weight. Questions answered.   Rx for diflucan given and instructed on how to use  Pelvic US to evaluate history of adnexal mass    She was counseled to follow up with her PCP for other routine health maintenance

## 2023-03-11 LAB
BACTERIA UR CULT: NORMAL
BACTERIA UR CULT: NORMAL

## 2023-03-13 ENCOUNTER — HOSPITAL ENCOUNTER (OUTPATIENT)
Dept: RADIOLOGY | Facility: OTHER | Age: 23
Discharge: HOME | End: 2023-03-13
Attending: OBSTETRICS & GYNECOLOGY
Payer: COMMERCIAL

## 2023-03-13 DIAGNOSIS — R10.2 PELVIC PAIN: ICD-10-CM

## 2023-03-13 LAB
C TRACH DNA SPEC QL NAA+PROBE: NOT DETECTED
N GONORRHOEA DNA SPEC QL NAA+PROBE: NOT DETECTED

## 2023-03-13 PROCEDURE — 76856 US EXAM PELVIC COMPLETE: CPT | Mod: 26,,, | Performed by: RADIOLOGY

## 2023-03-13 PROCEDURE — 76830 US PELVIS COMP WITH TRANSVAG NON-OB (XPD): ICD-10-PCS | Mod: 26,,, | Performed by: RADIOLOGY

## 2023-03-13 PROCEDURE — 76856 US PELVIS COMP WITH TRANSVAG NON-OB (XPD): ICD-10-PCS | Mod: 26,,, | Performed by: RADIOLOGY

## 2023-03-13 PROCEDURE — 76856 US EXAM PELVIC COMPLETE: CPT | Mod: TC

## 2023-03-13 PROCEDURE — 76830 TRANSVAGINAL US NON-OB: CPT | Mod: 26,,, | Performed by: RADIOLOGY

## 2023-03-15 ENCOUNTER — TELEPHONE (OUTPATIENT)
Dept: OBSTETRICS AND GYNECOLOGY | Facility: HOSPITAL | Age: 23
End: 2023-03-15
Payer: COMMERCIAL

## 2023-03-15 ENCOUNTER — PATIENT MESSAGE (OUTPATIENT)
Dept: OBSTETRICS AND GYNECOLOGY | Facility: CLINIC | Age: 23
End: 2023-03-15
Payer: COMMERCIAL

## 2023-03-15 DIAGNOSIS — E23.6 MASS OF PITUITARY: ICD-10-CM

## 2023-03-15 DIAGNOSIS — E66.9 OBESITY (BMI 35.0-39.9 WITHOUT COMORBIDITY): ICD-10-CM

## 2023-03-15 DIAGNOSIS — R73.03 PRE-DIABETES: ICD-10-CM

## 2023-03-15 DIAGNOSIS — Z87.42 HISTORY OF IRREGULAR MENSTRUAL CYCLES: Primary | ICD-10-CM

## 2023-03-15 LAB
CLINICAL INFO: NORMAL
CYTO CVX: NORMAL
CYTOLOGIST CVX/VAG CYTO: NORMAL
CYTOLOGIST CVX/VAG CYTO: NORMAL
CYTOLOGY CMNT CVX/VAG CYTO-IMP: NORMAL
CYTOLOGY PAP THIN PREP EXPLANATION: NORMAL
DATE OF PREVIOUS PAP: NORMAL
DATE PREVIOUS BX: NO
GEN CATEG CVX/VAG CYTO-IMP: NORMAL
HBA1C MFR BLD: 5.9 % OF TOTAL HGB
LMP START DATE: NORMAL
MICROORGANISM CVX/VAG CYTO: NORMAL
PATHOLOGIST CVX/VAG CYTO: NORMAL
SERVICE CMNT-IMP: NORMAL
SPECIMEN SOURCE CVX/VAG CYTO: NORMAL
STAT OF ADQ CVX/VAG CYTO-IMP: NORMAL

## 2023-03-15 NOTE — TELEPHONE ENCOUNTER
Called to discuss symptoms with patient. Patient is overall stressed about HbA1C with pre-diabetic result. She gained 40lbs recently despite changing her diet. She would like a referral to a nutritionist. She is seeing a PCP later this month.    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 Due to these symptoms, would recommend a workup for PCOS and elevated prolactin. Patient in agreement with plan.     Questions answered.

## 2023-03-29 ENCOUNTER — OFFICE VISIT (OUTPATIENT)
Dept: INTERNAL MEDICINE | Facility: CLINIC | Age: 23
End: 2023-03-29
Payer: COMMERCIAL

## 2023-03-29 VITALS
SYSTOLIC BLOOD PRESSURE: 120 MMHG | DIASTOLIC BLOOD PRESSURE: 79 MMHG | WEIGHT: 245.13 LBS | BODY MASS INDEX: 38.47 KG/M2 | HEIGHT: 67 IN | TEMPERATURE: 98 F

## 2023-03-29 DIAGNOSIS — H18.603 KERATOCONUS OF BOTH EYES: ICD-10-CM

## 2023-03-29 DIAGNOSIS — H55.81 DEFICIENT SACCADIC EYE MOVEMENTS: ICD-10-CM

## 2023-03-29 DIAGNOSIS — K21.9 GASTROESOPHAGEAL REFLUX DISEASE, UNSPECIFIED WHETHER ESOPHAGITIS PRESENT: ICD-10-CM

## 2023-03-29 DIAGNOSIS — Z76.89 ESTABLISHING CARE WITH NEW DOCTOR, ENCOUNTER FOR: Primary | ICD-10-CM

## 2023-03-29 DIAGNOSIS — R73.03 PREDIABETES: ICD-10-CM

## 2023-03-29 DIAGNOSIS — H55.82 DEFICIENT SMOOTH PURSUIT EYE MOVEMENTS: ICD-10-CM

## 2023-03-29 DIAGNOSIS — E23.6 RATHKE'S CLEFT CYST: ICD-10-CM

## 2023-03-29 DIAGNOSIS — Z00.00 LABORATORY EXAMINATION ORDERED AS PART OF A ROUTINE GENERAL MEDICAL EXAMINATION: ICD-10-CM

## 2023-03-29 PROCEDURE — 99395 PREV VISIT EST AGE 18-39: CPT | Mod: S$GLB,,, | Performed by: STUDENT IN AN ORGANIZED HEALTH CARE EDUCATION/TRAINING PROGRAM

## 2023-03-29 PROCEDURE — 99395 PR PREVENTIVE VISIT,EST,18-39: ICD-10-PCS | Mod: S$GLB,,, | Performed by: STUDENT IN AN ORGANIZED HEALTH CARE EDUCATION/TRAINING PROGRAM

## 2023-03-29 PROCEDURE — 99999 PR PBB SHADOW E&M-EST. PATIENT-LVL V: CPT | Mod: PBBFAC,,, | Performed by: STUDENT IN AN ORGANIZED HEALTH CARE EDUCATION/TRAINING PROGRAM

## 2023-03-29 PROCEDURE — 99999 PR PBB SHADOW E&M-EST. PATIENT-LVL V: ICD-10-PCS | Mod: PBBFAC,,, | Performed by: STUDENT IN AN ORGANIZED HEALTH CARE EDUCATION/TRAINING PROGRAM

## 2023-03-29 RX ORDER — PANTOPRAZOLE SODIUM 40 MG/1
40 TABLET, DELAYED RELEASE ORAL DAILY
Qty: 30 TABLET | Refills: 11 | Status: SHIPPED | OUTPATIENT
Start: 2023-03-29 | End: 2024-03-28

## 2023-03-29 NOTE — PATIENT INSTRUCTIONS
Avoid fried, spicy, acidic foods  Avoid caffeine  Avoid carbonated beverages  Avoid alcohol  Avoid large meals  Avoid eating within 2 hours exercise or 2 hours prior to sleep  Small, frequent meals are best   Bowie foods are better tolerated  Fish or chicken, baked or broiled

## 2023-03-29 NOTE — PROGRESS NOTES
SUBJECTIVE     Chief Complaint   Patient presents with    Annual Exam       HPI  Ashok Cabello is a 23 y.o. female with  H/o seizures, Anxiety, Depression, Migraines  that presents for establishment of care.     Patient with several complaints today.      Patient reports frequent symptoms of reflux such as nausea, burning in chest throughout the day.  Not improved with Pepcid 20 mg t.i.d. patient also reports dizziness where she feels like she is spinning throughout the day that she attributes to the reflux symptoms.  Denies any vomiting, no unintentional weight loss.    Patient reports frequent headaches over bilateral forehead and bilateral temples.  States that it feels like a sharp, stabbing pain.  Associated with phonophobia, no photophobia.  Has nausea, but attributes this to her reflux.  She describes visual auras before having headaches.    Patient has been experiencing heavy menstrual flow with cramping, sometimes has passage of clots.  Follows with gynecologist.  Is currently being worked up for PCOS.  Recent pelvic ultrasound was unable to fully view the ovaries, uterus concerning for adenomyosis.    Patient has several visual complaints.  Reports a history of carotid conus and glaucoma.  Patient wears corrective lenses, but still has to squint frequently.  Follows with ophthalmologist.    Patient has gained 50 lb since March of 2022.  States that she has not changed her diet or physical activity whatsoever.  Patient states that she is a Pescatarian  She avoids fried foods, sugary foods.  States she eats a lot of fruits and vegetables.  She reports being very active, states that she walks at park, does yoga, does stretching, and jogs every day.  Recent A1c consistent with prediabetes.  Patient does have a family history of diabetes.    Of note, patient was diagnosed with a Rathke's cleft in 2019, states that she has had frequent scans to monitor size.  MRI of the brain and pituitary with and without  contrast on 03/16/2022 showed nonenhancing 3 mm T1 hypointense pituitary lesion that may reflect Rathke's cleft cyst.  Rest of pituitary gland that was visualized was normal in size with homogeneous signal.  Infundibulum was not thickened and situated at the midline.    Tobacco: Never  EtOH: Socially   Recreational Drugs: None  Social narrative:  Patient a native of [place].  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/2020       FAMILY HISTORY:  Family History   Problem Relation Age of Onset    Hypertension Paternal Grandmother     Hyperlipidemia Paternal Grandmother     Heart failure Paternal Grandmother     Heart attack Paternal Grandmother     Stomach cancer Maternal Grandmother     Hypertension Maternal Grandmother     Diabetes Maternal Grandmother     Hypertension Father     Diabetes Father     Heart failure Father     Heart attack Father     Hypertension Mother        ALLERGIES AND MEDICATIONS: updated and reviewed.  Review of patient's allergies indicates:   Allergen Reactions    Parsley (petroselinum sp) Hives and Rash    Pineapple Hives and Rash     Current Outpatient Medications   Medication Sig Dispense Refill    azithromycin (ZITHROMAX Z-TERESITA) 250 MG tablet Take as directed on package (Patient not taking: Reported on 9/14/2022) 6 tablet 0    brimonidine 0.2% (ALPHAGAN) 0.2 % Drop Place into both eyes.      clotrimazole (LOTRIMIN) 1 % cream Apply to affected area 2 times daily 30 g 1    EPINEPHrine (EPIPEN 2-TERESITA) 0.3 mg/0.3 mL AtIn Inject 0.3 mLs (0.3 mg total) into the muscle once. for 1 dose 2 each 1    latanoprost 0.005 %  ophthalmic solution Place 1 drop into both eyes nightly.      naproxen (NAPROSYN) 500 MG tablet Take 1 tablet (500 mg total) by mouth 2 (two) times daily with meals. For pain (Patient not taking: Reported on 9/14/2022) 20 tablet 0    ondansetron (ZOFRAN-ODT) 4 MG TbDL Take 1 tablet (4 mg total) by mouth every 6 (six) hours as needed (Nausea and vomiting). (Patient not taking: Reported on 9/14/2022) 20 tablet 0    promethazine (PHENERGAN) 25 MG tablet Take 1 tablet (25 mg total) by mouth every 6 (six) hours as needed for Nausea. (Patient not taking: Reported on 9/14/2022) 25 tablet 0    sertraline (ZOLOFT) 50 MG tablet TAKE 1 TABLET BY MOUTH EVERY DAY 90 tablet 1    sumatriptan (IMITREX) 50 MG tablet Take 1 tablet (50 mg total) by mouth every 2 (two) hours as needed for Migraine (50 to 100 mg as a single dose ([facility] [Darryl 2013]; Jovan 2012). If symptoms persist or return, may repeat dose (usually same as first dose) after =2 hours. Maximum dose: 100 mg/dose; 200 mg per 24 hours.). 20 tablet 1    traZODone (DESYREL) 50 MG tablet TAKE 1 TABLET (50 MG TOTAL) BY MOUTH EVERY EVENING. 90 tablet 1    ulipristaL (BHAVNA) 30 mg tablet Take 1 tablet (30 mg total) by mouth once. for 1 dose 1 tablet 0     No current facility-administered medications for this visit.       ROS  Review of Systems   Constitutional:  Positive for unexpected weight change. Negative for activity change, chills and fever.   HENT:  Negative for congestion and hearing loss.    Eyes:  Positive for visual disturbance. Negative for photophobia and pain.   Respiratory:  Negative for cough and shortness of breath.    Cardiovascular:  Negative for chest pain and palpitations.   Gastrointestinal:  Positive for nausea. Negative for abdominal pain, constipation, diarrhea and vomiting.   Endocrine: Negative.    Genitourinary: Negative.    Musculoskeletal:  Negative for arthralgias and myalgias.   Skin: Negative.    Allergic/Immunologic: Negative.     Neurological:  Positive for headaches. Negative for dizziness and light-headedness.   Hematological: Negative.        OBJECTIVE     Physical Exam  Vitals:    03/29/23 1002   BP: 120/79   Temp: 98 °F (36.7 °C)    Body mass index is 38.4 kg/m².            Physical Exam  Vitals reviewed.   Constitutional:       General: She is not in acute distress.     Appearance: Normal appearance. She is obese.   HENT:      Head: Normocephalic and atraumatic.      Mouth/Throat:      Mouth: Mucous membranes are moist.      Pharynx: Oropharynx is clear.   Eyes:      Extraocular Movements: Extraocular movements intact.      Conjunctiva/sclera: Conjunctivae normal.      Pupils: Pupils are equal, round, and reactive to light.   Cardiovascular:      Rate and Rhythm: Normal rate and regular rhythm.      Pulses: Normal pulses.      Heart sounds: Normal heart sounds.   Pulmonary:      Effort: Pulmonary effort is normal.      Breath sounds: Normal breath sounds.   Abdominal:      General: Bowel sounds are normal. There is no distension.      Palpations: Abdomen is soft. There is no mass.      Tenderness: There is generalized abdominal tenderness. There is no guarding or rebound.   Musculoskeletal:         General: Normal range of motion.      Cervical back: Normal range of motion and neck supple. No rigidity or tenderness.      Right lower leg: No edema.      Left lower leg: No edema.   Lymphadenopathy:      Cervical: No cervical adenopathy.   Skin:     General: Skin is warm and dry.   Neurological:      General: No focal deficit present.      Mental Status: She is alert.      Comments: Frequent interruption of smooth pursuit noted bilaterally when checking CN III, IV, VI.   CN V, VII-XII grossly normal.    Psychiatric:         Mood and Affect: Mood normal.         Behavior: Behavior normal.         Health Maintenance         Date Due Completion Date    Hepatitis C Screening Never done ---    Lipid Panel Never done ---    HPV Vaccines (1 -  2-dose series) Never done ---    HIV Screening Never done ---    TETANUS VACCINE Never done ---    COVID-19 Vaccine (3 - Booster for Pfizer series) 10/19/2021 8/24/2021    Influenza Vaccine (1) Never done ---    Chlamydia Screening 03/10/2024 3/10/2023    Hemoglobin A1c (Prediabetes) 03/14/2024 3/14/2023    Pap Smear 03/10/2026 3/10/2023              ASSESSMENT     23 y.o. female with     1. Establishing care with new doctor, encounter for    2. Gastroesophageal reflux disease, unspecified whether esophagitis present    3. Keratoconus of both eyes    4. Rathke's cleft cyst    5. Prediabetes    6. Deficient saccadic eye movements    7. Deficient smooth pursuit eye movements    8. Laboratory examination ordered as part of a routine general medical examination        PLAN:     1. Establishing care with new doctor, encounter for  - Reviewed patient's medical, surgical, family, and social history; chart updated.     2. Gastroesophageal reflux disease, unspecified whether esophagitis present  - H&P consistent with GERD. No relief with H2 blocker. Trial of PPI.   - Counseled on dietary, lifestyle changes for GERD.     3. Keratoconus of both eyes  - Continue follow up with Ophthalmology.     4. Rathke's cleft cyst  - Patient reporting visual changes, unexplainable weight gain, problems associated with HPA axis, headaches in the setting of known pituitary mass. Will refer to NSGY.   - Ambulatory referral/consult to Neurosurgery; Future    5. Prediabetes  - Monitor carbohydrate intakes. Consider food journal.     6. Deficient saccadic eye movements  7. Deficient smooth pursuit eye movements  - In setting of pituitary mass. Referred to NSGY.     8. Laboratory examination ordered as part of a routine general medical examination  - LIPID PANEL; Future  - LIPASE; Future  - CBC Auto Differential; Future  - COMPREHENSIVE METABOLIC PANEL; Future  - HIV 1/2 Ag/Ab (4th Gen); Future  - HEPATITIS C ANTIBODY; Future    RTC in 3 months.       Sergo Eid MD  Family Medicine  [facility]  03/29/2023    This document was created using voice recognition software ([employer] Fluency Direct). Although it may be edited, this document may contain errors related to incorrect recognition of the spoken word. Please call the physician if clarification is needed.           No follow-ups on file.

## 2023-03-31 ENCOUNTER — PATIENT MESSAGE (OUTPATIENT)
Dept: NEUROSURGERY | Facility: CLINIC | Age: 23
End: 2023-03-31
Payer: COMMERCIAL

## 2023-04-07 LAB
ALBUMIN SERPL-MCNC: 4.2 G/DL (ref 3.6–5.1)
ALBUMIN/GLOB SERPL: 1.5 (CALC) (ref 1–2.5)
ALP SERPL-CCNC: 57 U/L (ref 31–125)
ALT SERPL-CCNC: 15 U/L (ref 6–29)
AST SERPL-CCNC: 11 U/L (ref 10–30)
BASOPHILS # BLD AUTO: 27 CELLS/UL (ref 0–200)
BASOPHILS NFR BLD AUTO: 0.4 %
BILIRUB SERPL-MCNC: 0.2 MG/DL (ref 0.2–1.2)
BUN SERPL-MCNC: 7 MG/DL (ref 7–25)
BUN/CREAT SERPL: ABNORMAL (CALC) (ref 6–22)
CALCIUM SERPL-MCNC: 10 MG/DL (ref 8.6–10.2)
CHLORIDE SERPL-SCNC: 107 MMOL/L (ref 98–110)
CHOLEST SERPL-MCNC: 188 MG/DL
CHOLEST/HDLC SERPL: 4.3 (CALC)
CO2 SERPL-SCNC: 24 MMOL/L (ref 20–32)
CREAT SERPL-MCNC: 0.71 MG/DL (ref 0.5–0.96)
EGFR: 123 ML/MIN/1.73M2
EOSINOPHIL # BLD AUTO: 241 CELLS/UL (ref 15–500)
EOSINOPHIL NFR BLD AUTO: 3.6 %
ERYTHROCYTE [DISTWIDTH] IN BLOOD BY AUTOMATED COUNT: 14.9 % (ref 11–15)
GLOBULIN SER CALC-MCNC: 2.8 G/DL (CALC) (ref 1.9–3.7)
GLUCOSE SERPL-MCNC: 100 MG/DL (ref 65–99)
HCT VFR BLD AUTO: 35.1 % (ref 35–45)
HCV AB S/CO SERPL IA: 0.08
HCV AB SERPL QL IA: NORMAL
HDLC SERPL-MCNC: 44 MG/DL
HGB BLD-MCNC: 11.8 G/DL (ref 11.7–15.5)
HIV 1+2 AB+HIV1 P24 AG SERPL QL IA: NORMAL
LDLC SERPL CALC-MCNC: 127 MG/DL (CALC)
LIPASE SERPL-CCNC: 14 U/L (ref 7–60)
LYMPHOCYTES # BLD AUTO: 2204 CELLS/UL (ref 850–3900)
LYMPHOCYTES NFR BLD AUTO: 32.9 %
MCH RBC QN AUTO: 28.2 PG (ref 27–33)
MCHC RBC AUTO-ENTMCNC: 33.6 G/DL (ref 32–36)
MCV RBC AUTO: 83.8 FL (ref 80–100)
MONOCYTES # BLD AUTO: 382 CELLS/UL (ref 200–950)
MONOCYTES NFR BLD AUTO: 5.7 %
NEUTROPHILS # BLD AUTO: 3846 CELLS/UL (ref 1500–7800)
NEUTROPHILS NFR BLD AUTO: 57.4 %
NONHDLC SERPL-MCNC: 144 MG/DL (CALC)
PLATELET # BLD AUTO: 399 THOUSAND/UL (ref 140–400)
PMV BLD REES-ECKER: 10.4 FL (ref 7.5–12.5)
POTASSIUM SERPL-SCNC: 4.2 MMOL/L (ref 3.5–5.3)
PROT SERPL-MCNC: 7 G/DL (ref 6.1–8.1)
RBC # BLD AUTO: 4.19 MILLION/UL (ref 3.8–5.1)
SODIUM SERPL-SCNC: 138 MMOL/L (ref 135–146)
TRIGL SERPL-MCNC: 74 MG/DL
WBC # BLD AUTO: 6.7 THOUSAND/UL (ref 3.8–10.8)

## 2023-04-20 ENCOUNTER — TELEPHONE (OUTPATIENT)
Dept: INTERNAL MEDICINE | Facility: CLINIC | Age: 23
End: 2023-04-20
Payer: COMMERCIAL

## 2023-04-20 NOTE — TELEPHONE ENCOUNTER
Called and spoke with pt, states she hasn't had meat in over 7 years. She already does all of the recommendations and lives a healthy lifestyle. She is wondering if it can be something else that has her cholesterol elevated like family history. She is asking if there is any other suggestions

## 2023-06-29 ENCOUNTER — HOSPITAL ENCOUNTER (EMERGENCY)
Facility: OTHER | Age: 23
Discharge: HOME | End: 2023-06-30
Attending: EMERGENCY MEDICINE
Payer: COMMERCIAL

## 2023-06-29 DIAGNOSIS — R10.2 PELVIC PAIN: Primary | ICD-10-CM

## 2023-06-29 DIAGNOSIS — N76.0 BACTERIAL VAGINOSIS: ICD-10-CM

## 2023-06-29 DIAGNOSIS — B96.89 BACTERIAL VAGINOSIS: ICD-10-CM

## 2023-06-29 LAB
ALBUMIN SERPL BCP-MCNC: 4.3 G/DL (ref 3.5–5.2)
ALP SERPL-CCNC: 70 U/L (ref 55–135)
ALT SERPL W/O P-5'-P-CCNC: 25 U/L (ref 10–44)
ANION GAP SERPL CALC-SCNC: 10 MMOL/L (ref 8–16)
AST SERPL-CCNC: 17 U/L (ref 10–40)
B-HCG UR QL: NEGATIVE
BACTERIA #/AREA URNS HPF: NORMAL /HPF
BACTERIA GENITAL QL WET PREP: ABNORMAL
BASOPHILS # BLD AUTO: 0.04 K/UL (ref 0–0.2)
BASOPHILS NFR BLD: 0.5 % (ref 0–1.9)
BILIRUB SERPL-MCNC: 0.2 MG/DL (ref 0.1–1)
BILIRUB UR QL STRIP: NEGATIVE
BUN SERPL-MCNC: 6 MG/DL (ref 6–20)
CALCIUM SERPL-MCNC: 9.5 MG/DL (ref 8.7–10.5)
CHLORIDE SERPL-SCNC: 108 MMOL/L (ref 95–110)
CLARITY UR: ABNORMAL
CLUE CELLS VAG QL WET PREP: ABNORMAL
CO2 SERPL-SCNC: 19 MMOL/L (ref 23–29)
COLOR UR: YELLOW
CREAT SERPL-MCNC: 0.8 MG/DL (ref 0.5–1.4)
CTP QC/QA: YES
DIFFERENTIAL METHOD: ABNORMAL
EOSINOPHIL # BLD AUTO: 0.1 K/UL (ref 0–0.5)
EOSINOPHIL NFR BLD: 0.8 % (ref 0–8)
ERYTHROCYTE [DISTWIDTH] IN BLOOD BY AUTOMATED COUNT: 14.5 % (ref 11.5–14.5)
EST. GFR  (NO RACE VARIABLE): >60 ML/MIN/1.73 M^2
FILAMENT FUNGI VAG WET PREP-#/AREA: ABNORMAL
GLUCOSE SERPL-MCNC: 87 MG/DL (ref 70–110)
GLUCOSE UR QL STRIP: NEGATIVE
HCT VFR BLD AUTO: 36.9 % (ref 37–48.5)
HGB BLD-MCNC: 11.5 G/DL (ref 12–16)
HGB UR QL STRIP: NEGATIVE
HYALINE CASTS #/AREA URNS LPF: 1 /LPF
IMM GRANULOCYTES # BLD AUTO: 0.03 K/UL (ref 0–0.04)
IMM GRANULOCYTES NFR BLD AUTO: 0.4 % (ref 0–0.5)
KETONES UR QL STRIP: NEGATIVE
LEUKOCYTE ESTERASE UR QL STRIP: ABNORMAL
LYMPHOCYTES # BLD AUTO: 3.3 K/UL (ref 1–4.8)
LYMPHOCYTES NFR BLD: 42.4 % (ref 18–48)
MCH RBC QN AUTO: 26.1 PG (ref 27–31)
MCHC RBC AUTO-ENTMCNC: 31.2 G/DL (ref 32–36)
MCV RBC AUTO: 84 FL (ref 82–98)
MICROSCOPIC COMMENT: NORMAL
MONOCYTES # BLD AUTO: 0.6 K/UL (ref 0.3–1)
MONOCYTES NFR BLD: 8 % (ref 4–15)
NEUTROPHILS # BLD AUTO: 3.7 K/UL (ref 1.8–7.7)
NEUTROPHILS NFR BLD: 47.9 % (ref 38–73)
NITRITE UR QL STRIP: NEGATIVE
NRBC BLD-RTO: 0 /100 WBC
PH UR STRIP: 8 [PH] (ref 5–8)
PLATELET # BLD AUTO: 355 K/UL (ref 150–450)
PMV BLD AUTO: 10.4 FL (ref 9.2–12.9)
POTASSIUM SERPL-SCNC: 3.7 MMOL/L (ref 3.5–5.1)
PROT SERPL-MCNC: 8.1 G/DL (ref 6–8.4)
PROT UR QL STRIP: ABNORMAL
RBC # BLD AUTO: 4.41 M/UL (ref 4–5.4)
RBC #/AREA URNS HPF: 2 /HPF (ref 0–4)
SODIUM SERPL-SCNC: 137 MMOL/L (ref 136–145)
SP GR UR STRIP: 1.02 (ref 1–1.03)
SPECIMEN SOURCE: ABNORMAL
SQUAMOUS #/AREA URNS HPF: 13 /HPF
T VAGINALIS GENITAL QL WET PREP: ABNORMAL
URN SPEC COLLECT METH UR: ABNORMAL
UROBILINOGEN UR STRIP-ACNC: NEGATIVE EU/DL
WBC # BLD AUTO: 7.79 K/UL (ref 3.9–12.7)
WBC #/AREA URNS HPF: 2 /HPF (ref 0–5)
WBC #/AREA VAG WET PREP: ABNORMAL
YEAST GENITAL QL WET PREP: ABNORMAL

## 2023-06-29 PROCEDURE — 99285 EMERGENCY DEPT VISIT HI MDM: CPT | Mod: 25

## 2023-06-29 PROCEDURE — 63600175 PHARM REV CODE 636 W HCPCS: Performed by: EMERGENCY MEDICINE

## 2023-06-29 PROCEDURE — 80053 COMPREHEN METABOLIC PANEL: CPT | Performed by: EMERGENCY MEDICINE

## 2023-06-29 PROCEDURE — 96372 THER/PROPH/DIAG INJ SC/IM: CPT | Performed by: EMERGENCY MEDICINE

## 2023-06-29 PROCEDURE — 85025 COMPLETE CBC W/AUTO DIFF WBC: CPT | Performed by: EMERGENCY MEDICINE

## 2023-06-29 PROCEDURE — 81025 URINE PREGNANCY TEST: CPT | Performed by: EMERGENCY MEDICINE

## 2023-06-29 PROCEDURE — 81000 URINALYSIS NONAUTO W/SCOPE: CPT | Performed by: EMERGENCY MEDICINE

## 2023-06-29 PROCEDURE — 87210 SMEAR WET MOUNT SALINE/INK: CPT | Performed by: EMERGENCY MEDICINE

## 2023-06-29 PROCEDURE — 87591 N.GONORRHOEAE DNA AMP PROB: CPT | Performed by: EMERGENCY MEDICINE

## 2023-06-29 RX ORDER — ONDANSETRON 2 MG/ML
4 INJECTION INTRAMUSCULAR; INTRAVENOUS
Status: DISCONTINUED | OUTPATIENT
Start: 2023-06-29 | End: 2023-06-30 | Stop reason: HOSPADM

## 2023-06-29 RX ORDER — KETOROLAC TROMETHAMINE 30 MG/ML
15 INJECTION, SOLUTION INTRAMUSCULAR; INTRAVENOUS
Status: COMPLETED | OUTPATIENT
Start: 2023-06-29 | End: 2023-06-29

## 2023-06-29 RX ORDER — KETOROLAC TROMETHAMINE 30 MG/ML
15 INJECTION, SOLUTION INTRAMUSCULAR; INTRAVENOUS
Status: DISCONTINUED | OUTPATIENT
Start: 2023-06-29 | End: 2023-06-29

## 2023-06-29 RX ADMIN — KETOROLAC TROMETHAMINE 15 MG: 30 INJECTION, SOLUTION INTRAMUSCULAR; INTRAVENOUS at 10:06

## 2023-06-30 VITALS
BODY MASS INDEX: 39.24 KG/M2 | DIASTOLIC BLOOD PRESSURE: 83 MMHG | TEMPERATURE: 98 F | RESPIRATION RATE: 18 BRPM | HEIGHT: 67 IN | OXYGEN SATURATION: 98 % | HEART RATE: 99 BPM | SYSTOLIC BLOOD PRESSURE: 128 MMHG | WEIGHT: 250 LBS

## 2023-06-30 LAB
C TRACH DNA SPEC QL NAA+PROBE: NOT DETECTED
N GONORRHOEA DNA SPEC QL NAA+PROBE: NOT DETECTED

## 2023-06-30 PROCEDURE — 25000003 PHARM REV CODE 250: Performed by: EMERGENCY MEDICINE

## 2023-06-30 RX ORDER — METRONIDAZOLE 500 MG/1
500 TABLET ORAL EVERY 12 HOURS
Qty: 13 TABLET | Refills: 0 | Status: SHIPPED | OUTPATIENT
Start: 2023-06-30 | End: 2023-07-07

## 2023-06-30 RX ORDER — IBUPROFEN 600 MG/1
600 TABLET ORAL EVERY 6 HOURS PRN
Qty: 20 TABLET | Refills: 0 | Status: SHIPPED | OUTPATIENT
Start: 2023-06-30

## 2023-06-30 RX ORDER — METRONIDAZOLE 500 MG/1
500 TABLET ORAL
Status: COMPLETED | OUTPATIENT
Start: 2023-06-30 | End: 2023-06-30

## 2023-06-30 RX ADMIN — METRONIDAZOLE 500 MG: 500 TABLET ORAL at 01:06

## 2023-06-30 NOTE — ED PROVIDER NOTES
Encounter Date: 6/29/2023    SCRIBE #1 NOTE: I, Camilla Bullock, am scribing for, and in the presence of,  Garrison Peralta MD. I have scribed the following portions of the note - Other sections scribed: HPI,PE,ROS.     History     Chief Complaint   Patient presents with    Pelvic Pain     Pt states that she started to have pelvic pain, with green discharge with painful urination.      This is a 23 y.o female with PMHx of seizures who presents with pelvic pain. Patient notes that before when she gets her period she would have pain like this but never this severe. She has a history of yeast infections. She notes that her symptoms is more like when she had BV in the past.  Patient LMP was 3 weeks ago, she notes heavy bleeding, clotting, burning when urinating, and spotting. 1 week ago patient noticed yellow discharge and persistent burning when urinating. 3 days ago patient started to feel sharp pain in pelvic area that radiated to her back , and today she noticed some right-sided chest pain as well.  She also has had nausea with a few episodes of emesis.  She thought her chest pain was related to vomiting so took a Pepcid with little improvement.  No fevers, diarrhea, cough, difficulty breathing, or other new complaints.  She is not concerned for STD.    The history is provided by the patient. No  was used.   Review of patient's allergies indicates:   Allergen Reactions    Parsley (petroselinum sp) Hives and Rash    Pineapple Hives and Rash     Past Medical History:   Diagnosis Date    Allergy     Anxiety     Depression     Seizures     epilepsy at 14-16    Sleep difficulties     Suicide attempt     Urticaria      Past Surgical History:   Procedure Laterality Date    BREAST RECONSTRUCTION  12/2020    BREAST SURGERY      reduction 12/2020    keratoconus       Family History   Problem Relation Age of Onset    Hypertension Mother     Hypertension Father     Diabetes Father     Heart failure  Father     Heart attack Father     Stomach cancer Maternal Grandmother     Hypertension Maternal Grandmother     Diabetes Maternal Grandmother     Thalassemia Maternal Grandmother     Hypertension Paternal Grandmother     Hyperlipidemia Paternal Grandmother     Heart failure Paternal Grandmother     Heart attack Paternal Grandmother      Social History     Tobacco Use    Smoking status: Never    Smokeless tobacco: Never   Substance Use Topics    Alcohol use: Yes     Comment: once every few months     Drug use: Never     Review of Systems   Constitutional:  Positive for appetite change. Negative for fever.   HENT:  Negative for congestion.    Eyes:  Negative for redness.   Respiratory:  Negative for shortness of breath.    Cardiovascular:  Positive for chest pain.   Gastrointestinal:  Positive for nausea and vomiting. Negative for abdominal pain.   Genitourinary:  Positive for decreased urine volume, difficulty urinating, frequency, pelvic pain, urgency and vaginal discharge. Negative for dysuria.   Skin:  Negative for rash.   Neurological:  Positive for light-headedness. Negative for headaches.   Psychiatric/Behavioral:  Negative for confusion.      Physical Exam     Initial Vitals [06/29/23 2059]   BP Pulse Resp Temp SpO2   (!) 164/99 99 18 98.2 °F (36.8 °C) 98 %      MAP       --         Physical Exam    Constitutional: She appears well-developed and well-nourished. She is not diaphoretic. No distress.   HENT:   Head: Normocephalic and atraumatic.   Eyes: Conjunctivae are normal.   Neck: Neck supple.   Cardiovascular:  Normal rate, regular rhythm, S1 normal, S2 normal, normal heart sounds and intact distal pulses.           No murmur heard.  Pulmonary/Chest: Breath sounds normal. No respiratory distress. She has no wheezes. She has no rhonchi. She has no rales.   Abdominal: Abdomen is soft. There is abdominal tenderness.   Mild suprapubic tenderness, no right lower quadrant tenderness There is no rebound and no  guarding.   Genitourinary:    Genitourinary Comments: Mild yellow vaginal discharge. Positive CMT.     Musculoskeletal:         General: No edema.      Cervical back: Neck supple.     Neurological: She is alert and oriented to person, place, and time. She has normal strength.   Skin: Skin is warm and dry.   Psychiatric: She has a normal mood and affect.       ED Course   Procedures  Labs Reviewed   VAGINAL SCREEN - Abnormal; Notable for the following components:       Result Value    Clue Cells Moderate (*)     WBC - Vaginal Screen Few (*)     Bacteria - Vaginal Screen Many (*)     All other components within normal limits    Narrative:     Release to patient->Immediate   URINALYSIS, REFLEX TO URINE CULTURE - Abnormal; Notable for the following components:    Appearance, UA Hazy (*)     Protein, UA Trace (*)     Leukocytes, UA 3+ (*)     All other components within normal limits    Narrative:     Specimen Source->Urine   CBC W/ AUTO DIFFERENTIAL - Abnormal; Notable for the following components:    Hemoglobin 11.5 (*)     Hematocrit 36.9 (*)     MCH 26.1 (*)     MCHC 31.2 (*)     All other components within normal limits   COMPREHENSIVE METABOLIC PANEL - Abnormal; Notable for the following components:    CO2 19 (*)     All other components within normal limits   C. TRACHOMATIS/N. GONORRHOEAE BY AMP DNA   URINALYSIS MICROSCOPIC    Narrative:     Specimen Source->Urine   POCT URINE PREGNANCY          Imaging Results              US Pelvis Complete Non OB (Final result)  Result time 06/30/23 00:11:50   Procedure changed from US Pelvis Comp with Transvag NON-OB (xpd     Final result by Anival Velasquez MD (06/30/23 00:11:50)                   Impression:      No significant abnormalities identified.    Nonvisualization of the left ovary.      Electronically signed by: Anival Velasquez MD  Date:    06/30/2023  Time:    00:11               Narrative:    EXAMINATION:  US PELVIS COMPLETE NON OB    CLINICAL HISTORY:  Pelvic  pain, h/o ovarian cysts;    TECHNIQUE:  Transabdominal sonography of the pelvis was performed.  Ultrasonographer reports patient refused transvaginal portion of the examination.    COMPARISON:  Pelvic ultrasound 03/13/2023.    FINDINGS:  The uterus measures 6 x 4 x 5 cm. Uterine parenchyma is homogeneous without evidence for masses. The endometrial echo complex is normal in thickness and measures 9 mm.    The right ovary measures 3 x 2 x 3 cm with preserved arterial and venous flow.  Left ovary was not visualized.  No definite adnexal abnormalities are seen.  No significant free fluid is seen.                                       Medications   ketorolac injection 15 mg (15 mg Intramuscular Given 6/29/23 0473)   metroNIDAZOLE tablet 500 mg (500 mg Oral Given 6/30/23 0135)     Medical Decision Making:   History:   Old Medical Records: I decided to obtain old medical records.  Initial Assessment:       23-year-old female with history of seizures, anxiety/depression presents for evaluation of worsening vaginal discharge and pelvic pain.  Patient states that she is currently seeing her gyn for possible endometriosis, gets frequent pelvic pain with her cycle and yeast infections.  She had a heavy cycle earlier this month with some spotting afterwards and dysuria, and a week ago started having vaginal discharge as well.  3 days ago she started having severe lower abdominal pelvic pain radiating to her back, as well as nausea and decreased appetite, with persistent urinary symptoms.  She also reported a brief episode of right-sided chest pain that she thought was related to heartburn.  No cough or shortness of breath or URI symptoms to suggest pneumonia, no known DVT/PE risk factors, she is PERC negative.  She is had similar but less severe pelvic pain and cramping with frequent yeast infections or before her cycle in the past.  On exam patient resting comfortably with normal vitals other than elevated blood pressure, with  mild suprapubic tenderness, but no focal right lower quadrant tenderness to suggest appendicitis.  Pelvic exam with mild yellow vaginal discharge with CMT.  Patient states that she is rarely sexually active and not concerned for STD.  Per chart review she did have BV infection treated by gyn few months ago, and states that her symptoms are more similar to this versus typical yeast infection.  Concern for BV potentially causing cervicitis, less likely GC/chlamydia, would also consider ovarian cyst.  Will also evaluate for UTI.      Initial labs reassuring with no elevated WBC or significant CMP abnormalities.  UA with 3+ leuks but only 2 wbc's, not consistent with UTI.  Vaginal screen did show clue cells suggestive of BV.  After IM Toradol patient feels pain improved, no current nausea or emesis while monitored in ED. pelvic ultrasound done with no significant abnormalities, though unable to visualize left ovary since patient refused transvaginal.  No significant right lower quadrant tenderness to suggest appendicitis, no indication for emergent CT at this time.  Will treat empirically for BV with Flagyl x7 days, 1st dose in ED, and continue NSAIDs for acute on chronic pelvic pain.  She is advised to follow-up with her gyn for further management of her irregular cycles and frequent pelvic pain and yeast infection episodes, and understands return to the ED for any worsening or other concerns.      Clinical Tests:   Lab Tests: Ordered and Reviewed  Radiological Study: Ordered and Reviewed        Scribe Attestation:   Scribe #1: I performed the above scribed service and the documentation accurately describes the services I performed. I attest to the accuracy of the note.                   Clinical Impression:   Final diagnoses:  [R10.2] Pelvic pain (Primary)  [N76.0, B96.89] Bacterial vaginosis        ED Disposition Condition    Discharge Stable          ED Prescriptions       Medication Sig Dispense Start Date End Date  Auth. Provider    metroNIDAZOLE (FLAGYL) 500 MG tablet Take 1 tablet (500 mg total) by mouth every 12 (twelve) hours. for 7 days 13 tablet 6/30/2023 7/7/2023 Garrison Peralta MD    ibuprofen (ADVIL,MOTRIN) 600 MG tablet Take 1 tablet (600 mg total) by mouth every 6 (six) hours as needed for Pain. 20 tablet 6/30/2023 -- Garrison Peralta MD          Follow-up Information       Follow up With Specialties Details Why Contact Info    Arabella Sam MD Obstetrics and Gynecology Schedule an appointment as soon as possible for a visit in 3 days  [address] [place] [id number]  [phone]               Garrison Peralta MD  06/30/23 2216

## 2023-07-03 ENCOUNTER — OFFICE VISIT (OUTPATIENT)
Dept: OBSTETRICS AND GYNECOLOGY | Facility: CLINIC | Age: 23
End: 2023-07-03
Payer: COMMERCIAL

## 2023-07-03 ENCOUNTER — LAB VISIT (OUTPATIENT)
Dept: LAB | Facility: OTHER | Age: 23
End: 2023-07-03
Payer: COMMERCIAL

## 2023-07-03 VITALS
DIASTOLIC BLOOD PRESSURE: 78 MMHG | WEIGHT: 240.5 LBS | SYSTOLIC BLOOD PRESSURE: 114 MMHG | HEIGHT: 67 IN | BODY MASS INDEX: 37.75 KG/M2

## 2023-07-03 DIAGNOSIS — E34.9 DISORDER OF ENDOCRINE SYSTEM: ICD-10-CM

## 2023-07-03 DIAGNOSIS — R10.2 FEMALE PELVIC PAIN: ICD-10-CM

## 2023-07-03 DIAGNOSIS — Z30.09 ENCOUNTER FOR OTHER GENERAL COUNSELING OR ADVICE ON CONTRACEPTION: ICD-10-CM

## 2023-07-03 DIAGNOSIS — R63.5 WEIGHT GAIN: Primary | ICD-10-CM

## 2023-07-03 DIAGNOSIS — R30.0 DYSURIA: ICD-10-CM

## 2023-07-03 DIAGNOSIS — L68.0 HIRSUTISM: ICD-10-CM

## 2023-07-03 DIAGNOSIS — R63.5 WEIGHT GAIN: ICD-10-CM

## 2023-07-03 LAB
ESTIMATED AVG GLUCOSE: 120 MG/DL (ref 68–131)
FSH SERPL-ACNC: 3.2 MIU/ML
HBA1C MFR BLD: 5.8 % (ref 4–5.6)
PROLACTIN SERPL IA-MCNC: 20.9 NG/ML (ref 5.2–26.5)
TSH SERPL DL<=0.005 MIU/L-ACNC: 2.92 UIU/ML (ref 0.4–4)

## 2023-07-03 PROCEDURE — 83036 HEMOGLOBIN GLYCOSYLATED A1C: CPT

## 2023-07-03 PROCEDURE — 83498 ASY HYDROXYPROGESTERONE 17-D: CPT

## 2023-07-03 PROCEDURE — 99999 PR PBB SHADOW E&M-EST. PATIENT-LVL III: ICD-10-PCS | Mod: PBBFAC,,, | Performed by: OBSTETRICS & GYNECOLOGY

## 2023-07-03 PROCEDURE — 99999 PR PBB SHADOW E&M-EST. PATIENT-LVL III: CPT | Mod: PBBFAC,,, | Performed by: OBSTETRICS & GYNECOLOGY

## 2023-07-03 PROCEDURE — 84443 ASSAY THYROID STIM HORMONE: CPT

## 2023-07-03 PROCEDURE — 87086 URINE CULTURE/COLONY COUNT: CPT

## 2023-07-03 PROCEDURE — 99214 PR OFFICE/OUTPT VISIT, EST, LEVL IV, 30-39 MIN: ICD-10-PCS | Mod: S$GLB,,, | Performed by: OBSTETRICS & GYNECOLOGY

## 2023-07-03 PROCEDURE — 82626 DEHYDROEPIANDROSTERONE: CPT

## 2023-07-03 PROCEDURE — 99214 OFFICE O/P EST MOD 30 MIN: CPT | Mod: S$GLB,,, | Performed by: OBSTETRICS & GYNECOLOGY

## 2023-07-03 PROCEDURE — 83001 ASSAY OF GONADOTROPIN (FSH): CPT

## 2023-07-03 PROCEDURE — 84402 ASSAY OF FREE TESTOSTERONE: CPT

## 2023-07-03 PROCEDURE — 36415 COLL VENOUS BLD VENIPUNCTURE: CPT

## 2023-07-03 PROCEDURE — 84146 ASSAY OF PROLACTIN: CPT

## 2023-07-03 NOTE — PROGRESS NOTES
"Past medical, surgical, social, family, and obstetric histories; medications; prior records and results; and available outside records were reviewed and updated in the EMR.  Pertinent findings were noted below.    Reason for Visit   Vaginal Discharge and Pelvic Pain    HPI   23 y.o. female  who presents for pelvic pain and recurrent vaginal discharge. She reports being evaluated in the ED recently for pelvic pain and increased discharge. In the ED, her TVUS was normal (however, unable to visualize the left ovary) and vaginosis swab resulted positive for BV which was treated appropriately.     She reports an increase in weight gain despite healthy eating habits and regular exercise. She has also endorses new onset hair growth on her face and chest area. She expresses concern for PCOS. Of note, the patient has regular menstrual cycles occurring once a month lasting 7 days with a heavy flow. Endorses not being interested in hormonal mgmt  as she would like to stay as natural as possible.    Patient's last menstrual period was 2023.    Exam   /78   Ht 5' 7" (1.702 m)   Wt 109.1 kg (240 lb 8.4 oz)   LMP 2023   BMI 37.67 kg/m²     Physical Exam  Genitourinary:      Vulva normal.      Right Labia: No rash, lesions or Bartholin's cyst.     Left Labia: No lesions, Bartholin's cyst or rash.     No vaginal discharge, tenderness or bleeding.        Right Adnexa: not tender, not full and no mass present.     Left Adnexa: not tender, not full and no mass present.     No cervical motion tenderness or lesion.      Uterus is not enlarged or tender.      Pelvic exam was performed with patient in the lithotomy position.   Exam conducted with a chaperone present.     Assessment and Plan   Weight gain  -     TSH; Future; Expected date: 2023  -     Hemoglobin A1C; Future; Expected date: 2023    Hirsutism  -     Testosterone, Free; Future; Expected date: 2023  -     DHEA; Future; Expected " date: 07/03/2023  -     17-Hydroxyprogesterone; Future; Expected date: 07/03/2023    Disorder of endocrine system  -     FOLLICLE STIMULATING HORMONE; Future; Expected date: 07/03/2023  -     Prolactin; Future; Expected date: 07/03/2023  -     Hemoglobin A1C; Future; Expected date: 07/03/2023    Encounter for other general counseling or advice on contraception    Female pelvic pain  -     CULTURE, URINE    Dysuria  -     CULTURE, URINE    Discussed with patient that her symptoms may be due to PCOS or other condition. Recommend labs as above.  Discussed hormonal contraception in the setting of PCOS and for management of pelvic pain.  Urine culture collected and sent to lab.  Pt s/p flagyl. Symptoms improving. No new swabs collected today.  Will follow-up.    Lori Billy MD PGY-2  Obstetrics and Gynecology    I have reviewed the Resident's progress note.  I have personally interviewed and examined the patient at bedside and agree with the findings as documented.  All patient questions answered.  -- SAGRARIO Rojas M.D.    Total time of 30 minutes, including face-to-face time and non-face-to-face time preparing to see the patient (eg, review of tests), obtaining and/or reviewing separately obtained history, documenting clinical information in the electronic or other health record, independently interpreting results, communicating results to the patient/family/caregiver, or care coordination.

## 2023-07-04 LAB
BACTERIA UR CULT: NORMAL
BACTERIA UR CULT: NORMAL

## 2023-07-07 LAB
17OHP SERPL-MCNC: 172 NG/DL (ref 35–413)
TESTOST FREE SERPL-MCNC: 0.8 PG/ML

## 2023-07-08 LAB — DHEA SERPL-MCNC: 5.07 NG/ML (ref 1.33–7.78)

## 2023-08-11 ENCOUNTER — PATIENT MESSAGE (OUTPATIENT)
Dept: RESEARCH | Facility: HOSPITAL | Age: 23
End: 2023-08-11
Payer: COMMERCIAL

## 2023-09-28 ENCOUNTER — HOSPITAL ENCOUNTER (EMERGENCY)
Facility: HOSPITAL | Age: 23
Discharge: HOME | End: 2023-09-28
Attending: EMERGENCY MEDICINE
Payer: COMMERCIAL

## 2023-09-28 VITALS
SYSTOLIC BLOOD PRESSURE: 118 MMHG | TEMPERATURE: 98 F | HEIGHT: 67 IN | RESPIRATION RATE: 20 BRPM | HEART RATE: 84 BPM | WEIGHT: 220 LBS | OXYGEN SATURATION: 99 % | DIASTOLIC BLOOD PRESSURE: 67 MMHG | BODY MASS INDEX: 34.53 KG/M2

## 2023-09-28 DIAGNOSIS — R55 SYNCOPE, UNSPECIFIED SYNCOPE TYPE: Primary | ICD-10-CM

## 2023-09-28 DIAGNOSIS — R40.20 LOSS OF CONSCIOUSNESS: ICD-10-CM

## 2023-09-28 DIAGNOSIS — S01.511A LIP LACERATION, INITIAL ENCOUNTER: ICD-10-CM

## 2023-09-28 LAB
ALBUMIN SERPL BCP-MCNC: 4.2 G/DL (ref 3.5–5.2)
ALP SERPL-CCNC: 65 U/L (ref 55–135)
ALT SERPL W/O P-5'-P-CCNC: 35 U/L (ref 10–44)
ANION GAP SERPL CALC-SCNC: 9 MMOL/L (ref 8–16)
AST SERPL-CCNC: 42 U/L (ref 10–40)
B-HCG UR QL: NEGATIVE
BASOPHILS # BLD AUTO: 0.03 K/UL (ref 0–0.2)
BASOPHILS NFR BLD: 0.4 % (ref 0–1.9)
BILIRUB SERPL-MCNC: 0.2 MG/DL (ref 0.1–1)
BUN SERPL-MCNC: 6 MG/DL (ref 6–20)
CALCIUM SERPL-MCNC: 9.5 MG/DL (ref 8.7–10.5)
CHLORIDE SERPL-SCNC: 106 MMOL/L (ref 95–110)
CO2 SERPL-SCNC: 25 MMOL/L (ref 23–29)
CREAT SERPL-MCNC: 0.8 MG/DL (ref 0.5–1.4)
CTP QC/QA: YES
DIFFERENTIAL METHOD: ABNORMAL
EOSINOPHIL # BLD AUTO: 0 K/UL (ref 0–0.5)
EOSINOPHIL NFR BLD: 0.5 % (ref 0–8)
ERYTHROCYTE [DISTWIDTH] IN BLOOD BY AUTOMATED COUNT: 17.2 % (ref 11.5–14.5)
EST. GFR  (NO RACE VARIABLE): >60 ML/MIN/1.73 M^2
GLUCOSE SERPL-MCNC: 79 MG/DL (ref 70–110)
HCT VFR BLD AUTO: 37.3 % (ref 37–48.5)
HGB BLD-MCNC: 12 G/DL (ref 12–16)
HIV 1+2 AB+HIV1 P24 AG SERPL QL IA: NORMAL
IMM GRANULOCYTES # BLD AUTO: 0.02 K/UL (ref 0–0.04)
IMM GRANULOCYTES NFR BLD AUTO: 0.3 % (ref 0–0.5)
LYMPHOCYTES # BLD AUTO: 2.1 K/UL (ref 1–4.8)
LYMPHOCYTES NFR BLD: 28.8 % (ref 18–48)
MCH RBC QN AUTO: 26.8 PG (ref 27–31)
MCHC RBC AUTO-ENTMCNC: 32.2 G/DL (ref 32–36)
MCV RBC AUTO: 83 FL (ref 82–98)
MONOCYTES # BLD AUTO: 0.4 K/UL (ref 0.3–1)
MONOCYTES NFR BLD: 5.2 % (ref 4–15)
NEUTROPHILS # BLD AUTO: 4.7 K/UL (ref 1.8–7.7)
NEUTROPHILS NFR BLD: 64.8 % (ref 38–73)
NRBC BLD-RTO: 0 /100 WBC
PLATELET # BLD AUTO: 277 K/UL (ref 150–450)
PMV BLD AUTO: 10.9 FL (ref 9.2–12.9)
POTASSIUM SERPL-SCNC: 4.3 MMOL/L (ref 3.5–5.1)
PROT SERPL-MCNC: 7.8 G/DL (ref 6–8.4)
RBC # BLD AUTO: 4.48 M/UL (ref 4–5.4)
SODIUM SERPL-SCNC: 140 MMOL/L (ref 136–145)
WBC # BLD AUTO: 7.29 K/UL (ref 3.9–12.7)

## 2023-09-28 PROCEDURE — 81025 URINE PREGNANCY TEST: CPT | Performed by: PHYSICIAN ASSISTANT

## 2023-09-28 PROCEDURE — 12011 RPR F/E/E/N/L/M 2.5 CM/<: CPT

## 2023-09-28 PROCEDURE — 93005 ELECTROCARDIOGRAM TRACING: CPT

## 2023-09-28 PROCEDURE — 99284 EMERGENCY DEPT VISIT MOD MDM: CPT | Mod: 25

## 2023-09-28 PROCEDURE — 85025 COMPLETE CBC W/AUTO DIFF WBC: CPT | Performed by: PHYSICIAN ASSISTANT

## 2023-09-28 PROCEDURE — 80053 COMPREHEN METABOLIC PANEL: CPT | Performed by: EMERGENCY MEDICINE

## 2023-09-28 PROCEDURE — 93010 ELECTROCARDIOGRAM REPORT: CPT | Mod: ,,, | Performed by: INTERNAL MEDICINE

## 2023-09-28 PROCEDURE — 93010 EKG 12-LEAD: ICD-10-PCS | Mod: ,,, | Performed by: INTERNAL MEDICINE

## 2023-09-28 PROCEDURE — 87389 HIV-1 AG W/HIV-1&-2 AB AG IA: CPT | Performed by: EMERGENCY MEDICINE

## 2023-09-28 PROCEDURE — 12051 INTMD RPR FACE/MM 2.5 CM/<: CPT

## 2023-09-28 PROCEDURE — 63600175 PHARM REV CODE 636 W HCPCS: Performed by: PHYSICIAN ASSISTANT

## 2023-09-28 PROCEDURE — 25000003 PHARM REV CODE 250: Performed by: PHYSICIAN ASSISTANT

## 2023-09-28 RX ORDER — ACETAMINOPHEN 500 MG
1000 TABLET ORAL
Status: COMPLETED | OUTPATIENT
Start: 2023-09-28 | End: 2023-09-28

## 2023-09-28 RX ORDER — TRIAMCINOLONE ACETONIDE 40 MG/ML
30 INJECTION, SUSPENSION INTRA-ARTICULAR; INTRAMUSCULAR ONCE
Status: COMPLETED | OUTPATIENT
Start: 2023-09-28 | End: 2023-09-28

## 2023-09-28 RX ORDER — BACITRACIN ZINC 500 [USP'U]/G
1 OINTMENT TOPICAL
Status: COMPLETED | OUTPATIENT
Start: 2023-09-28 | End: 2023-09-28

## 2023-09-28 RX ADMIN — TRIAMCINOLONE ACETONIDE 32 MG: 40 INJECTION, SUSPENSION INTRA-ARTICULAR; INTRAMUSCULAR at 05:09

## 2023-09-28 RX ADMIN — Medication 1 EACH: at 06:09

## 2023-09-28 RX ADMIN — ACETAMINOPHEN 1000 MG: 500 TABLET ORAL at 02:09

## 2023-09-28 NOTE — DISCHARGE INSTRUCTIONS
You should follow-up with your primary doctor if you continue to have lightheadedness or have more episodes of fainting.  Stay hydrated.     You can wash your wound gently with soap and water.  You may apply antibiotic ointment, but do not use for more than 2-3 days. Follow up with your primary care provider, any urgent care, or return to the ER in 5 days for suture removal and reevaluation of the wound. Return to the ER immediately for any signs of infection including swelling, redness, warmth, or pus draining from the wound; fevers >100.4, chest pain, confusion, weakness or numbness in your arms or legs or for any new or concerning symptoms.

## 2023-09-28 NOTE — ED PROVIDER NOTES
Encounter Date: 9/28/2023       History     Chief Complaint   Patient presents with    Loss of Consciousness    Lip Laceration     23-year-old female with anxiety, depression, seizures presents to the ED for evaluation of syncope.  Patient states that she was taking a shower when she began to feel lightheaded.  Patient states that she woke up on the floor.  She states that she has had some intermittent lightheadedness over the past few days.  She states that she is currently menstruating and has had heavier bleeding than normal. Patient sustained a lip laceration.  Patient states that she went to her orthodontist because she has braces.  She states that she was told that she has a through-and-through lip laceration. Pt also complaining of a headache.  Rates it 8/10.  Denies nausea, vomiting, neck pain.  Patient also has some pain to the right side of her body including the upper arm, flank, hip.  She has not had any difficulty with range of motion of the right upper extremity or difficulty with ambulation.       Review of patient's allergies indicates:   Allergen Reactions    Parsley (petroselinum sp) Hives and Rash    Pineapple Hives and Rash     Past Medical History:   Diagnosis Date    Allergy     Anxiety     Depression     Seizures     epilepsy at 15-16    Sleep difficulties     Suicide attempt     Urticaria      Past Surgical History:   Procedure Laterality Date    BREAST RECONSTRUCTION  12/2020    BREAST SURGERY      reduction 12/2020    keratoconus       Family History   Problem Relation Age of Onset    Hypertension Mother     Hypertension Father     Diabetes Father     Heart failure Father     Heart attack Father     Stomach cancer Maternal Grandmother     Hypertension Maternal Grandmother     Diabetes Maternal Grandmother     Thalassemia Maternal Grandmother     Hypertension Paternal Grandmother     Hyperlipidemia Paternal Grandmother     Heart failure Paternal Grandmother     Heart attack Paternal  Grandmother      Social History     Tobacco Use    Smoking status: Never    Smokeless tobacco: Never   Substance Use Topics    Alcohol use: Yes     Comment: once every few months     Drug use: Never     Review of Systems   Skin:  Positive for wound.   Neurological:  Positive for syncope and headaches.       Physical Exam     Initial Vitals [09/28/23 1201]   BP Pulse Resp Temp SpO2   (!) 155/74 98 16 97.8 °F (36.6 °C) 100 %      MAP       --         Physical Exam    Nursing note and vitals reviewed.  Constitutional: She appears well-developed and well-nourished. She is not diaphoretic. She is Obese .  Non-toxic appearance. She does not appear ill. No distress.   HENT:   Head: Normocephalic. Head is with laceration.   1.5cm laceration  just bordering the vermilion border of the lower right lip. Swelling to the lower R lip. The laceration is not through-and-through. Abrasion to the inner mucosa of R lower lip.    Eyes: Conjunctivae and EOM are normal. Pupils are equal, round, and reactive to light. No scleral icterus.   Neck: Neck supple.   Cardiovascular:  Normal rate and regular rhythm.     Exam reveals no gallop and no friction rub.       No murmur heard.  Pulmonary/Chest: Effort normal and breath sounds normal. No accessory muscle usage. No tachypnea. No respiratory distress. She has no decreased breath sounds. She has no wheezes. She has no rhonchi. She has no rales.   Abdominal: She exhibits no distension.   Musculoskeletal:      Cervical back: Neck supple. No spinous process tenderness or muscular tenderness.      Comments: No bony tenderness to the C, T, L spinous process, major joints of the right upper extremity.  No tenderness to the right flank area.  No ecchymosis.  No significant tenderness to the right hip.  Full range of motion of the hip without significant pain or difficulty.     Neurological: She is alert.   Skin: No rash noted.   Psychiatric: She has a normal mood and affect. Her behavior is normal.          ED Course   Lac Repair    Date/Time: 9/28/2023 6:31 PM    Performed by: Anny Gilbert PA-C  Authorized by: Todd Ely MD    Consent:     Consent obtained:  Verbal    Consent given by:  Patient    Risks discussed:  Infection, pain and poor cosmetic result  Universal protocol:     Patient identity confirmed:  Verbally with patient  Anesthesia:     Anesthesia method:  Nerve block    Block location:  Mental nerve block    Block anesthetic:  Bupivacaine 0.5% WITH epi    Block injection procedure:  Anatomic landmarks identified, incremental injection, anatomic landmarks palpated and introduced needle    Block outcome:  Anesthesia achieved  Laceration details:     Location:  Lip    Lip location:  Lower exterior lip    Length (cm):  1.5    Depth (mm):  4  Pre-procedure details:     Preparation:  Patient was prepped and draped in usual sterile fashion  Treatment:     Area cleansed with:  Saline    Amount of cleaning:  Standard    Irrigation solution:  Sterile saline    Irrigation volume:  0.5L    Irrigation method:  Pressure wash    Layers/structures repaired:  Deep subcutaneous  Deep subcutaneous:     Suture size:  5-0    Suture material:  Vicryl    Suture technique:  Simple interrupted    Number of sutures:  1  Skin repair:     Repair method:  Sutures    Suture size:  6-0    Suture material:  Nylon    Suture technique:  Simple interrupted    Number of sutures:  5  Approximation:     Approximation:  Close    Vermilion border well-aligned: yes    Repair type:     Repair type:  Simple  Post-procedure details:     Procedure completion:  Tolerated well, no immediate complications    Labs Reviewed   CBC W/ AUTO DIFFERENTIAL - Abnormal; Notable for the following components:       Result Value    MCH 26.8 (*)     RDW 17.2 (*)     All other components within normal limits   COMPREHENSIVE METABOLIC PANEL - Abnormal; Notable for the following components:    AST 42 (*)     All other components within normal limits    HIV 1 / 2 ANTIBODY    Narrative:     Release to patient->Immediate   POCT URINE PREGNANCY        ECG Results              EKG 12-lead (Final result)  Result time 09/28/23 14:24:59      Final result by Interface, Lab In [facility] (09/28/23 14:24:59)                   Narrative:    Test Reason : R40.20,    Vent. Rate : 077 BPM     Atrial Rate : 077 BPM     P-R Int : 134 ms          QRS Dur : 070 ms      QT Int : 370 ms       P-R-T Axes : 092 012 011 degrees     QTc Int : 418 ms    Normal sinus rhythm  Normal ECG  When compared with ECG of 18-DEC-2021 14:50,  Nonspecific T wave abnormality no longer evident in Anterior leads  Confirmed by DAVID CRUZ MD ([id number]) on 9/28/2023 2:24:53 PM    Referred By: PABLO   SELF           Confirmed By:DAVID CRUZ MD                                  Imaging Results    None          Medications   acetaminophen tablet 1,000 mg (1,000 mg Oral Given 9/28/23 1425)   triamcinolone acetonide injection 32 mg (32 mg Intradermal Given 9/28/23 1711)   bacitracin zinc ointment 1 each (1 each Topical (Top) Given 9/28/23 1836)     Medical Decision Making  23-year-old female presents the ED for evaluation of a syncopal episode with facial trauma and lip laceration.  Vitals within normal limits.  Nonfocal neurologic exam.  See physical exam details above.  My differential diagnosis includes but is not limited to:  Hypoglycemia, orthostatic hypotension, anemia, contusion, laceration     Labs unremarkable.  Based on nexus to and [place] head CT rules, no CT imaging is indicated at this time.  Patient reported significant improvement in her headache and facial pain following administration of Tylenol.  Laceration was repaired.  See details in procedure note.  Wound care and follow-up instructions provided.  ED return precautions given.  Patient voiced understanding. I have reviewed the patient's records and discussed this case with my supervising physician.         Amount and/or Complexity  of Data Reviewed  Labs: ordered.    Risk  OTC drugs.  Prescription drug management.                               Clinical Impression:   Final diagnoses:  [R40.20] Loss of consciousness  [R55] Syncope, unspecified syncope type (Primary)  [S01.511A] Lip laceration, initial encounter        ED Disposition Condition    Discharge Stable          ED Prescriptions    None       Follow-up Information       Follow up With Specialties Details Why Contact Info    Sergo Eid MD Family Medicine   [address] [place] [id number]  [phone]               Anny Gilbert PASALENA  09/29/23 2822

## 2023-09-28 NOTE — ED TRIAGE NOTES
Pt arrives to ED stating she became light headed in the shower and fell. Pt endorses hitting her head. Pt states she hit her lip. Pt states her lip was stuck on her braces. Pt states she went to the orthodontist and had the lip removed from the braces revealing a laceration.

## 2023-10-03 ENCOUNTER — OFFICE VISIT (OUTPATIENT)
Dept: INTERNAL MEDICINE | Facility: CLINIC | Age: 23
End: 2023-10-03
Payer: COMMERCIAL

## 2023-10-03 VITALS
HEIGHT: 67 IN | BODY MASS INDEX: 38.72 KG/M2 | TEMPERATURE: 98 F | HEART RATE: 67 BPM | OXYGEN SATURATION: 99 % | WEIGHT: 246.69 LBS | SYSTOLIC BLOOD PRESSURE: 115 MMHG | DIASTOLIC BLOOD PRESSURE: 60 MMHG

## 2023-10-03 DIAGNOSIS — N92.0 MENORRHAGIA WITH REGULAR CYCLE: ICD-10-CM

## 2023-10-03 DIAGNOSIS — R55 VASOVAGAL SYNCOPE: ICD-10-CM

## 2023-10-03 DIAGNOSIS — Z48.02 VISIT FOR SUTURE REMOVAL: Primary | ICD-10-CM

## 2023-10-03 DIAGNOSIS — S01.511D LIP LACERATION, SUBSEQUENT ENCOUNTER: ICD-10-CM

## 2023-10-03 PROCEDURE — 99214 PR OFFICE/OUTPT VISIT, EST, LEVL IV, 30-39 MIN: ICD-10-PCS | Mod: S$GLB,,, | Performed by: STUDENT IN AN ORGANIZED HEALTH CARE EDUCATION/TRAINING PROGRAM

## 2023-10-03 PROCEDURE — 99214 OFFICE O/P EST MOD 30 MIN: CPT | Mod: S$GLB,,, | Performed by: STUDENT IN AN ORGANIZED HEALTH CARE EDUCATION/TRAINING PROGRAM

## 2023-10-03 PROCEDURE — 99999 PR PBB SHADOW E&M-EST. PATIENT-LVL V: ICD-10-PCS | Mod: PBBFAC,,, | Performed by: STUDENT IN AN ORGANIZED HEALTH CARE EDUCATION/TRAINING PROGRAM

## 2023-10-03 PROCEDURE — 99999 PR PBB SHADOW E&M-EST. PATIENT-LVL V: CPT | Mod: PBBFAC,,, | Performed by: STUDENT IN AN ORGANIZED HEALTH CARE EDUCATION/TRAINING PROGRAM

## 2023-10-03 NOTE — PATIENT INSTRUCTIONS
Keep lip clean with soap, water. Can use an emollient like vaseline, neosporin to keep skin moist.   Follow up with OB/Gyn.     Over the Counter Options for Pain    Voltaren Gel: Apply 2 g to skin over affected areas 4 times a day as needed.     Capsaicin Cream: Apply to skin over affected area 3 to 4 times a day as needed. Do not apply to open wounds or irritated skin. Wash your hands after application to avoid getting it in eyes.     Lidocaine (Salonpas) Patch: Apply over affected are and leave in place for 8 to 12 hours as needed.     Acetaminophen (Tylenol): Take 1,000 mg by mouth every 4 to 6 hours as needed. Do not take more than 4,000 mg in a day.     Ice Packs:  - Fill a bag with crushed ice about half full. Remove the air from the bag before you close it. You can also use a bag of frozen vegetables.    - Wrap the ice pack in a cloth to protect your skin from frostbite or other injury.    - Put the ice over the injured area for 20 to 30 minutes or as long as directed.  Check your skin after about 30 seconds for color changes or blistering. Remove the ice if you notice skin changes or you feel burning or numbness in the area.    - Throw the ice pack away after use.    - Apply ice to your injured area 4 times each day or as directed. Ask your healthcare provider how many days you should apply ice.    Heating Pad:  Apply to affected area for NO LONGER than 15 minutes. Use a layer of towels between your skin and the heating pad. Remove for at least 1 hour then repeat. 2-3 applications a day is advisable.

## 2023-10-03 NOTE — PROGRESS NOTES
SUBJECTIVE     Chief Complaint   Patient presents with    Follow-up     ER follow up/stitches in bottom lip       HPI  Ashok Cabello is a 23 y.o. female with  PTSD, Rathkey's cleft cyst  that presents for follow-up.     Patient seen in the ED on 09/28/2023 after falling in her shower after feeling lightheaded.  Had had some intermittent lightheadedness in the setting of heavy menstrual bleeding the few days preceding presentation to the ED. suffered lip laceration.    Workup in the ED with CBC and CMP both grossly normal.  UPT negative.  EKG with normal sinus rhythm and no ischemic wave changes.  Lip laceration repaired with 6 sutures, one of which was dissolvable.     Still having some aches in neck and back. Responding to ibuprofen. Improving.     Since discharge from the ED. Lightheadedness has improved. Patient concerned due to continue heavy bleeding and heavy cramping around her menses. Pelvic US in 6/2023 with no significant abnormalities.       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         FAMILY HISTORY:  Family History   Problem Relation Age of Onset    Hypertension Mother     Hypertension Father     Diabetes Father     Heart failure Father     Heart attack Father     Stomach cancer Maternal Grandmother     Hypertension Maternal Grandmother     Diabetes Maternal Grandmother     Thalassemia Maternal Grandmother     Hypertension Paternal Grandmother     Hyperlipidemia Paternal Grandmother     Heart failure Paternal Grandmother     Heart attack Paternal Grandmother        ALLERGIES AND MEDICATIONS: updated and reviewed.  Review of patient's allergies indicates:   Allergen Reactions     Parsley (petroselinum sp) Hives and Rash    Pineapple Hives and Rash     Current Outpatient Medications   Medication Sig Dispense Refill    azithromycin (ZITHROMAX Z-TERESITA) 250 MG tablet Take as directed on package 6 tablet 0    brimonidine 0.2% (ALPHAGAN) 0.2 % Drop Place into both eyes.      ibuprofen (ADVIL,MOTRIN) 600 MG tablet Take 1 tablet (600 mg total) by mouth every 6 (six) hours as needed for Pain. 20 tablet 0    latanoprost 0.005 % ophthalmic solution Place 1 drop into both eyes nightly.      naproxen (NAPROSYN) 500 MG tablet Take 1 tablet (500 mg total) by mouth 2 (two) times daily with meals. For pain 20 tablet 0    ondansetron (ZOFRAN-ODT) 4 MG TbDL Take 1 tablet (4 mg total) by mouth every 6 (six) hours as needed (Nausea and vomiting). 20 tablet 0    pantoprazole (PROTONIX) 40 MG tablet Take 1 tablet (40 mg total) by mouth once daily. 30 tablet 11    promethazine (PHENERGAN) 25 MG tablet Take 1 tablet (25 mg total) by mouth every 6 (six) hours as needed for Nausea. 25 tablet 0    sertraline (ZOLOFT) 50 MG tablet TAKE 1 TABLET BY MOUTH EVERY DAY 90 tablet 1    clotrimazole (LOTRIMIN) 1 % cream Apply to affected area 2 times daily 30 g 1    EPINEPHrine (EPIPEN 2-TERESITA) 0.3 mg/0.3 mL AtIn Inject 0.3 mLs (0.3 mg total) into the muscle once. for 1 dose 2 each 1    sumatriptan (IMITREX) 50 MG tablet Take 1 tablet (50 mg total) by mouth every 2 (two) hours as needed for Migraine (50 to 100 mg as a single dose ([facility] [Darryl 2013]; [place] 2012). If symptoms persist or return, may repeat dose (usually same as first dose) after =2 hours. Maximum dose: 100 mg/dose; 200 mg per 24 hours.). 20 tablet 1    traZODone (DESYREL) 50 MG tablet TAKE 1 TABLET (50 MG TOTAL) BY MOUTH EVERY EVENING. 90 tablet 1    ulipristaL (BHAVNA) 30 mg tablet Take 1 tablet (30 mg total) by mouth once. for 1 dose 1 tablet 0     No current facility-administered medications for this visit.       ROS  Review of Systems   Constitutional:  Negative  "for activity change, chills and fever.   HENT:  Negative for congestion and hearing loss.    Eyes:  Negative for pain and visual disturbance.   Respiratory:  Negative for cough and shortness of breath.    Cardiovascular:  Negative for chest pain and palpitations.   Gastrointestinal:  Negative for abdominal pain, constipation, diarrhea, nausea and vomiting.   Endocrine: Negative.    Genitourinary: Negative.    Musculoskeletal:  Negative for arthralgias and myalgias.   Skin: Negative.    Allergic/Immunologic: Negative.    Neurological:  Negative for dizziness, light-headedness and headaches.   Hematological: Negative.          OBJECTIVE     Physical Exam  Vitals:    10/03/23 1433   BP: 115/60   Pulse: 67   Temp: 98.2 °F (36.8 °C)    Body mass index is 38.64 kg/m².  Weight: 111.9 kg (246 lb 11.1 oz)   Height: 5' 7" (170.2 cm)     Physical Exam  Vitals reviewed.   Constitutional:       General: She is not in acute distress.     Appearance: Normal appearance. She is obese.   HENT:      Head: Normocephalic and atraumatic.        Mouth/Throat:      Mouth: Mucous membranes are moist.      Pharynx: Oropharynx is clear.   Eyes:      Extraocular Movements: Extraocular movements intact.      Conjunctiva/sclera: Conjunctivae normal.      Pupils: Pupils are equal, round, and reactive to light.   Cardiovascular:      Rate and Rhythm: Normal rate and regular rhythm.      Pulses: Normal pulses.      Heart sounds: Normal heart sounds.   Pulmonary:      Effort: Pulmonary effort is normal.      Breath sounds: Normal breath sounds.   Abdominal:      General: There is no distension.   Musculoskeletal:         General: Normal range of motion.      Cervical back: Normal range of motion and neck supple.   Skin:     General: Skin is warm and dry.   Neurological:      General: No focal deficit present.      Mental Status: She is alert.           Health Maintenance         Date Due Completion Date    Pneumococcal Vaccines (Age 0-64) (1 - PCV) " Never done ---    HPV Vaccines (1 - 2-dose series) Never done ---    TETANUS VACCINE Never done ---    COVID-19 Vaccine (3 - Pfizer series) 10/19/2021 8/24/2021    Influenza Vaccine (1) Never done ---    Chlamydia Screening 06/29/2024 6/29/2023    Hemoglobin A1c (Prediabetes) 07/03/2024 7/3/2023    Pap Smear 03/10/2026 3/10/2023              ASSESSMENT     23 y.o. female with     1. Visit for suture removal    2. Lip laceration, subsequent encounter    3. Vasovagal syncope    4. Menorrhagia with regular cycle        PLAN:     1. Visit for suture removal  - Sutures removed, patient tolerated removal well. Care instructions reviewed with patient who verbalized understanding.     2. Lip laceration, subsequent encounter  - Suture removed as above. Healing well. Continued care instructions reviewed with patient.     3. Vasovagal syncope  - Likely in the setting of heavy menstrual bleeding, hot shower. Precautions to avoid hot showers during cycle reviewed. Increase hydration. Patient verbalized understanding.     4. Menorrhagia with regular cycle  - Continue follow up with Gynecology.         RTC PRN       Sergo Eid MD  Family Medicine  [facility]  10/03/2023          No follow-ups on file.

## 2023-11-10 ENCOUNTER — OFFICE VISIT (OUTPATIENT)
Dept: INTERNAL MEDICINE | Facility: CLINIC | Age: 23
End: 2023-11-10
Payer: COMMERCIAL

## 2023-11-10 VITALS
DIASTOLIC BLOOD PRESSURE: 72 MMHG | HEART RATE: 72 BPM | OXYGEN SATURATION: 99 % | SYSTOLIC BLOOD PRESSURE: 124 MMHG | HEIGHT: 67 IN | BODY MASS INDEX: 38.69 KG/M2 | WEIGHT: 246.5 LBS

## 2023-11-10 DIAGNOSIS — E55.9 VITAMIN D DEFICIENCY: ICD-10-CM

## 2023-11-10 DIAGNOSIS — R42 ORTHOSTATIC DIZZINESS: ICD-10-CM

## 2023-11-10 DIAGNOSIS — R53.1 WEAKNESS: ICD-10-CM

## 2023-11-10 DIAGNOSIS — R63.5 WEIGHT GAIN: ICD-10-CM

## 2023-11-10 DIAGNOSIS — R25.2 CRAMPING OF HANDS: Primary | ICD-10-CM

## 2023-11-10 DIAGNOSIS — R25.2 SPASTICITY: ICD-10-CM

## 2023-11-10 DIAGNOSIS — R42 LIGHTHEADED: ICD-10-CM

## 2023-11-10 PROCEDURE — 99214 PR OFFICE/OUTPT VISIT, EST, LEVL IV, 30-39 MIN: ICD-10-PCS | Mod: S$GLB,,, | Performed by: STUDENT IN AN ORGANIZED HEALTH CARE EDUCATION/TRAINING PROGRAM

## 2023-11-10 PROCEDURE — 99999 PR PBB SHADOW E&M-EST. PATIENT-LVL V: CPT | Mod: PBBFAC,,, | Performed by: STUDENT IN AN ORGANIZED HEALTH CARE EDUCATION/TRAINING PROGRAM

## 2023-11-10 PROCEDURE — 99214 OFFICE O/P EST MOD 30 MIN: CPT | Mod: S$GLB,,, | Performed by: STUDENT IN AN ORGANIZED HEALTH CARE EDUCATION/TRAINING PROGRAM

## 2023-11-10 PROCEDURE — 99999 PR PBB SHADOW E&M-EST. PATIENT-LVL V: ICD-10-PCS | Mod: PBBFAC,,, | Performed by: STUDENT IN AN ORGANIZED HEALTH CARE EDUCATION/TRAINING PROGRAM

## 2023-11-10 NOTE — PROGRESS NOTES
"SUBJECTIVE     No chief complaint on file.      HPI  Ashok Cabello is a 23 y.o. female with  Rathke's cleft cyst  that presents for evaluation of muscles spasms.       Had episode consistent with vasovagal syncope, fell and hit face, sustaining laceration.   Shortly after describes frequent muscle spasms and tremors in bilateral hands and feet.     Reporting frequent cramping in the hands and feet. Feels tight, stiff.   Feels lightheaded with getting up. Will be worsened with changing positions. Will get tingling sensation in legs.     Reports being tired all the time. Feels "frail" with things falling out of hand frequently.     States that she feels like she has not drive to drink water.     MRI Brain in 3/2023 showing no acute intracranial pathology. Has Rathke's cleft cyst.     Reports history of Vit D deficiency, but states she does she has some abdominal pain after taking it.     Has gained weight from March 2023. Was 239, she is 246 today. She denies any changes to diet, exercise. Hikes and walks frequently. Keeps a vegan diet.     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         FAMILY HISTORY:  Family History   Problem Relation Age of Onset    Hypertension Mother     Hypertension Father     Diabetes Father     Heart failure Father     Heart attack Father     Stomach cancer Maternal Grandmother     Hypertension Maternal Grandmother     Diabetes Maternal Grandmother     Thalassemia Maternal Grandmother     Hypertension Paternal Grandmother     Hyperlipidemia Paternal Grandmother     Heart failure Paternal Grandmother     Heart attack Paternal Grandmother        ALLERGIES AND MEDICATIONS: " updated and reviewed.  Review of patient's allergies indicates:   Allergen Reactions    Parsley (petroselinum sp) Hives and Rash    Pineapple Hives and Rash     Current Outpatient Medications   Medication Sig Dispense Refill    azithromycin (ZITHROMAX Z-TERESITA) 250 MG tablet Take as directed on package 6 tablet 0    brimonidine 0.2% (ALPHAGAN) 0.2 % Drop Place into both eyes.      ibuprofen (ADVIL,MOTRIN) 600 MG tablet Take 1 tablet (600 mg total) by mouth every 6 (six) hours as needed for Pain. 20 tablet 0    latanoprost 0.005 % ophthalmic solution Place 1 drop into both eyes nightly.      naproxen (NAPROSYN) 500 MG tablet Take 1 tablet (500 mg total) by mouth 2 (two) times daily with meals. For pain 20 tablet 0    ondansetron (ZOFRAN-ODT) 4 MG TbDL Take 1 tablet (4 mg total) by mouth every 6 (six) hours as needed (Nausea and vomiting). 20 tablet 0    pantoprazole (PROTONIX) 40 MG tablet Take 1 tablet (40 mg total) by mouth once daily. 30 tablet 11    promethazine (PHENERGAN) 25 MG tablet Take 1 tablet (25 mg total) by mouth every 6 (six) hours as needed for Nausea. 25 tablet 0    sertraline (ZOLOFT) 50 MG tablet TAKE 1 TABLET BY MOUTH EVERY DAY 90 tablet 1    clotrimazole (LOTRIMIN) 1 % cream Apply to affected area 2 times daily 30 g 1    EPINEPHrine (EPIPEN 2-TERESITA) 0.3 mg/0.3 mL AtIn Inject 0.3 mLs (0.3 mg total) into the muscle once. for 1 dose 2 each 1    sumatriptan (IMITREX) 50 MG tablet Take 1 tablet (50 mg total) by mouth every 2 (two) hours as needed for Migraine (50 to 100 mg as a single dose ([facility] [Darryl 2013]; [place] 2012). If symptoms persist or return, may repeat dose (usually same as first dose) after =2 hours. Maximum dose: 100 mg/dose; 200 mg per 24 hours.). 20 tablet 1    traZODone (DESYREL) 50 MG tablet TAKE 1 TABLET (50 MG TOTAL) BY MOUTH EVERY EVENING. 90 tablet 1    ulipristaL (BHAVNA) 30 mg tablet Take 1 tablet (30 mg total) by mouth once. for 1 dose 1 tablet 0     No current  "facility-administered medications for this visit.       ROS  Review of Systems   Neurological:  Positive for tremors.         OBJECTIVE     Physical Exam  Vitals:    11/10/23 1457   BP: 118/62   Pulse: 88    Body mass index is 38.6 kg/m².  Weight: 111.8 kg (246 lb 7.6 oz)   Height: 5' 7" (170.2 cm)     Physical Exam  Vitals reviewed.   Constitutional:       General: She is not in acute distress.     Appearance: Normal appearance. She is obese.   HENT:      Head: Normocephalic and atraumatic.      Mouth/Throat:      Mouth: Mucous membranes are moist.      Pharynx: Oropharynx is clear.   Eyes:      Extraocular Movements: Extraocular movements intact.      Conjunctiva/sclera: Conjunctivae normal.      Pupils: Pupils are equal, round, and reactive to light.   Cardiovascular:      Rate and Rhythm: Normal rate and regular rhythm.      Pulses: Normal pulses.      Heart sounds: Normal heart sounds.   Pulmonary:      Effort: Pulmonary effort is normal.      Breath sounds: Normal breath sounds.   Abdominal:      General: Bowel sounds are normal. There is no distension.      Palpations: Abdomen is soft. There is no mass.      Tenderness: There is no abdominal tenderness. There is no guarding.   Musculoskeletal:         General: Normal range of motion.      Right hand: No swelling or deformity. Normal range of motion. Normal capillary refill. Normal pulse.      Left hand: No swelling or deformity. Normal range of motion. Normal capillary refill. Normal pulse.      Cervical back: Normal range of motion and neck supple. No rigidity or tenderness.      Right lower leg: No edema.      Left lower leg: No edema.   Lymphadenopathy:      Cervical: No cervical adenopathy.   Skin:     General: Skin is warm and dry.   Neurological:      General: No focal deficit present.      Mental Status: She is alert.      Cranial Nerves: Cranial nerves 2-12 are intact.      Sensory: Sensation is intact.      Motor: Motor function is intact. No " weakness (5/5 strength in all extremities,  strenght full and equal), tremor, atrophy or abnormal muscle tone.      Coordination: Coordination is intact. Finger-Nose-Finger Test and Heel to Shin Test normal.      Gait: Gait is intact.   Psychiatric:         Mood and Affect: Mood normal.         Behavior: Behavior normal.           Health Maintenance         Date Due Completion Date    Pneumococcal Vaccines (Age 0-64) (1 - PCV) Never done ---    HPV Vaccines (1 - 2-dose series) Never done ---    TETANUS VACCINE Never done ---    Influenza Vaccine (1) Never done ---    COVID-19 Vaccine (3 - 2023-24 season) 09/01/2023 8/24/2021    Chlamydia Screening 06/29/2024 6/29/2023    Hemoglobin A1c (Prediabetes) 07/03/2024 7/3/2023    Pap Smear 03/10/2026 3/10/2023              ASSESSMENT     23 y.o. female with     1. Cramping of hands    2. Spasticity    3. Weakness    4. Lightheaded    5. Vitamin D deficiency    6. Weight gain    7. Orthostatic dizziness        PLAN:     1. Cramping of hands  2. Spasticity  3. Weakness  - Neurologically non-focal on exam today; normal MSK exam. Unclear etiology of these symptoms. Reports decreased water intake, so dehydration could be contributing. Has history of anemia and reports menorrhagia, which could be contributing to these symptoms. Work up as below.   - Recommended Vitamin B complex, Magnesium glycinate supplements for cramping.   - TSH; Future  - CBC Auto Differential; Future  - COMPREHENSIVE METABOLIC PANEL; Future  - VITAMIN B12; Future  - FOLATE; Future  - Aldolase; Future  - CK; Future  - Magnesium; Future  - IRON AND TIBC; Future  - FERRITIN; Future  - Sedimentation rate; Future  - C-REACTIVE PROTEIN; Future    4. Lightheaded  - Increase water intake.   - TSH; Future  - CBC Auto Differential; Future  - COMPREHENSIVE METABOLIC PANEL; Future  - VITAMIN B12; Future  - FOLATE; Future  - Aldolase; Future  - CK; Future  - Magnesium; Future  - IRON AND TIBC; Future    5. Vitamin D  deficiency  - Can switch D3 to D2 supplement. Recheck Vitamin D level.   - Vitamin D; Future    6. Weight gain  - Recommend keeping food and activity journal.   - CORTISOL, RANDOM; Future    7. Orthostatic dizziness  - Orthostatic pressures normal.   - Increase water intake, avoid quick positional changes.   - TSH; Future  - CBC Auto Differential; Future  - COMPREHENSIVE METABOLIC PANEL; Future        RTC in 2 weeks for virtual visit.        Sergo Eid MD  Family Medicine  [facility]  11/10/2023    This document was created using voice recognition software ([employer]). Although it may be edited, this document may contain errors related to incorrect recognition of the spoken word. Please call the physician if clarification is needed.       No follow-ups on file.

## 2023-11-10 NOTE — PATIENT INSTRUCTIONS
Increase your water intake. Recommend taking small sips of water.     To prevent cramps, recommend the following:   - Ensure adequate water intake. There is no rule-of-thumb, but a good estimate is to aim for 0.5 oz of water per pound of body weight if you are very active.   - Vitamin B complex (make sure it has at least 30 mg of Vitamin B6).  - Vitamin E 800 IU at bed time.   - Consider taking Ferrous Sulfate 325 mg by mouth every other day  - Magnesium glycinate 400 mg daily.   - Stretching legs/body before bed.   - Soaking in warm tub before bed.   - Gentle massage to muscles before bed.     Can take ERGOCALCIFEROL, which is Vitamin D2 rather D3    Delano Bridges MD in the [facility] for OB/GYN

## 2023-11-22 ENCOUNTER — TELEPHONE (OUTPATIENT)
Dept: INTERNAL MEDICINE | Facility: CLINIC | Age: 23
End: 2023-11-22
Payer: COMMERCIAL

## 2023-11-22 NOTE — TELEPHONE ENCOUNTER
----- Message from Kilo To sent at 11/22/2023 12:39 PM CST -----  Contact: Self [phone]  a phone call.  Who left a message:  Self  Do they know what this is regarding:  Calling to speak with someone in office about the pt lab. Please call back to advise.  Would they like a phone call back or a response via [facility]:  call back

## 2024-05-06 DIAGNOSIS — D49.7 NEOPLASM OF PITUITARY GLAND: Primary | ICD-10-CM

## 2024-05-13 ENCOUNTER — OFFICE VISIT (OUTPATIENT)
Dept: PRIMARY CARE CLINIC | Facility: CLINIC | Age: 24
End: 2024-05-13
Payer: COMMERCIAL

## 2024-05-13 VITALS
DIASTOLIC BLOOD PRESSURE: 72 MMHG | OXYGEN SATURATION: 98 % | SYSTOLIC BLOOD PRESSURE: 110 MMHG | HEART RATE: 88 BPM | WEIGHT: 235.44 LBS | HEIGHT: 67 IN | BODY MASS INDEX: 36.95 KG/M2

## 2024-05-13 DIAGNOSIS — Z00.00 ANNUAL PHYSICAL EXAM: Primary | ICD-10-CM

## 2024-05-13 DIAGNOSIS — R53.83 FATIGUE, UNSPECIFIED TYPE: ICD-10-CM

## 2024-05-13 DIAGNOSIS — K21.9 GASTROESOPHAGEAL REFLUX DISEASE, UNSPECIFIED WHETHER ESOPHAGITIS PRESENT: ICD-10-CM

## 2024-05-13 DIAGNOSIS — E66.01 MORBID OBESITY: ICD-10-CM

## 2024-05-13 PROCEDURE — 99999 PR PBB SHADOW E&M-EST. PATIENT-LVL III: CPT | Mod: PBBFAC,,, | Performed by: STUDENT IN AN ORGANIZED HEALTH CARE EDUCATION/TRAINING PROGRAM

## 2024-05-13 PROCEDURE — 99395 PREV VISIT EST AGE 18-39: CPT | Mod: S$GLB,,, | Performed by: STUDENT IN AN ORGANIZED HEALTH CARE EDUCATION/TRAINING PROGRAM

## 2024-05-13 PROCEDURE — 99214 OFFICE O/P EST MOD 30 MIN: CPT | Mod: 25,S$GLB,, | Performed by: STUDENT IN AN ORGANIZED HEALTH CARE EDUCATION/TRAINING PROGRAM

## 2024-05-13 RX ORDER — METFORMIN HYDROCHLORIDE 500 MG/1
TABLET, EXTENDED RELEASE ORAL
COMMUNITY
Start: 2024-05-05

## 2024-05-13 RX ORDER — PANTOPRAZOLE SODIUM 40 MG/1
40 TABLET, DELAYED RELEASE ORAL DAILY
Qty: 90 TABLET | Refills: 1 | Status: SHIPPED | OUTPATIENT
Start: 2024-05-13 | End: 2024-05-16

## 2024-05-13 NOTE — PROGRESS NOTES
SUBJECTIVE     Chief Complaint   Patient presents with    Establish Care       HPI  Ashok Cabello is a 24 y.o. female with medical diagnoses as listed in the medical history and problem list that presents for annual exam. Pt is establishing care with me today.    Pt is UTD on age appropriate CA screening.    Family, social, surgical Hx reviewed     GERD: Endorses, epigastric discomfort, gas (belching), and acidic taste in mouth. Denies dysphagia. No issues swallowing food or fluid. No regurgitation.        Health Maintenance         Date Due Completion Date    Pneumococcal Vaccines (Age 0-64) (1 of 2 - PCV) Never done ---    HPV Vaccines (1 - 3-dose series) Never done ---    TETANUS VACCINE 07/09/2023 7/9/2013    COVID-19 Vaccine (3 - 2023-24 season) 09/01/2023 8/24/2021    Chlamydia Screening 06/29/2024 6/29/2023    Hemoglobin A1c (Prediabetes) 07/03/2024 7/3/2023    Influenza Vaccine (Season Ended) 09/01/2024 ---    Pap Smear 03/10/2026 3/10/2023          Separate EM concern:    Fatigue-Endorses increased fatigue over the past several months. No changes in activities, diet, or sleep. No medication changes. No recent illnesses.      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         SOCIAL HISTORY:  Social History     Socioeconomic History    Marital status: Single    Number of children: 0   Tobacco Use    Smoking status: Never    Smokeless tobacco: Never   Substance and Sexual Activity    Alcohol use: Yes     Comment: once every few months     Drug use: Never    Sexual activity: Not Currently       FAMILY HISTORY:  Family History   Problem Relation Name Age of Onset    Hypertension Mother       "Hypertension Father      Diabetes Father      Heart failure Father      Heart attack Father      Stomach cancer Maternal Grandmother      Hypertension Maternal Grandmother      Diabetes Maternal Grandmother      Thalassemia Maternal Grandmother      Hypertension Paternal Grandmother      Hyperlipidemia Paternal Grandmother      Heart failure Paternal Grandmother      Heart attack Paternal Grandmother         ALLERGIES AND MEDICATIONS: updated and reviewed.  Review of patient's allergies indicates:   Allergen Reactions    Parsley (petroselinum sp) Hives and Rash    Pineapple Hives and Rash     Current Outpatient Medications   Medication Sig Dispense Refill    metFORMIN (GLUCOPHAGE-XR) 500 MG ER 24hr tablet       EPINEPHrine (EPIPEN 2-TERESITA) 0.3 mg/0.3 mL AtIn Inject 0.3 mLs (0.3 mg total) into the muscle once. for 1 dose 2 each 1    pantoprazole (PROTONIX) 40 MG tablet Take 1 tablet (40 mg total) by mouth once daily. 90 tablet 1     No current facility-administered medications for this visit.       ROS  Review of Systems   Constitutional:  Positive for malaise/fatigue. Negative for fever and weight loss.   Respiratory:  Negative for cough and shortness of breath.    Cardiovascular:  Negative for chest pain and palpitations.   Gastrointestinal:  Negative for abdominal pain, constipation, diarrhea, nausea and vomiting.   Genitourinary:  Negative for dysuria.   Musculoskeletal:  Negative for back pain and joint pain.   Skin:  Negative for rash.   Neurological:  Negative for dizziness, weakness and headaches.   Psychiatric/Behavioral:  Negative for depression. The patient is not nervous/anxious.            OBJECTIVE     Physical Exam  Vitals:    05/13/24 1006   BP: 110/72   Pulse: 88    Body mass index is 36.88 kg/m².  Weight: 106.8 kg (235 lb 7.2 oz)   Height: 5' 7" (170.2 cm)     Physical Exam  HENT:      Head: Normocephalic and atraumatic.      Nose: Nose normal.      Mouth/Throat:      Mouth: Mucous membranes are " moist.      Pharynx: Oropharynx is clear.   Eyes:      Extraocular Movements: Extraocular movements intact.      Conjunctiva/sclera: Conjunctivae normal.      Pupils: Pupils are equal, round, and reactive to light.   Cardiovascular:      Rate and Rhythm: Normal rate and regular rhythm.   Pulmonary:      Effort: Pulmonary effort is normal.      Breath sounds: Normal breath sounds.   Musculoskeletal:         General: No swelling. Normal range of motion.      Cervical back: Normal range of motion.      Right lower leg: No edema.      Left lower leg: No edema.   Skin:     General: Skin is warm.      Findings: No lesion or rash.   Neurological:      General: No focal deficit present.      Mental Status: She is alert and oriented to person, place, and time.      Motor: No weakness.   Psychiatric:         Mood and Affect: Mood normal.         Thought Content: Thought content normal.               ASSESSMENT     24 y.o. female with     1. Annual physical exam    2. Morbid obesity    3. Gastroesophageal reflux disease, unspecified whether esophagitis present    4. Fatigue, unspecified type        PLAN:     1. Annual physical exam  -     Cancel: TSH; Future; Expected date: 05/13/2024  -     Cancel: Lipid Panel; Future; Expected date: 05/13/2024  -     Cancel: Comprehensive Metabolic Panel; Future; Expected date: 05/13/2024  -     Cancel: CBC Auto Differential; Future; Expected date: 05/13/2024  -     CBC Auto Differential; Future; Expected date: 05/13/2024  -     Comprehensive Metabolic Panel; Future; Expected date: 05/13/2024  -     Lipid Panel; Future; Expected date: 05/13/2024  -     TSH; Future; Expected date: 05/13/2024    2. Morbid obesity  -     Cancel: Hemoglobin A1C; Future; Expected date: 05/13/2024  -     Hemoglobin A1C; Future; Expected date: 05/13/2024    3. Gastroesophageal reflux disease, unspecified whether esophagitis present  -     Ambulatory referral/consult to Gastroenterology; Future; Expected date:  05/20/2024  -     pantoprazole (PROTONIX) 40 MG tablet; Take 1 tablet (40 mg total) by mouth once daily.  Dispense: 90 tablet; Refill: 1  Patient was instructed to avoid foods that can trigger heartburn symptoms such as acidic foods and spicy foods.  Avoid lying down within 30 minutes after meals.    4. Fatigue, unspecified type  -     Cancel: Iron and TIBC; Future; Expected date: 05/13/2024  -     Cancel: FERRITIN; Future; Expected date: 05/13/2024  -     Cancel: Vitamin D; Future; Expected date: 05/13/2024  -     Vitamin D; Future; Expected date: 05/13/2024  -     FERRITIN; Future; Expected date: 05/13/2024  -     Iron and TIBC; Future; Expected date: 05/13/2024  Follow up lab work. Counseled obtaining 7-8 hours of nightly sleep, adequate hydration with water, well balanced diet. And 150+ minutes of exercise/week.        Discussed age and gender appropriate screenings at this visit and encouraged a healthy diet low in simple carbohydrates, and increased physical activity.  Counseled on medically appropriate vaccines based on age and current health condition.  Screening test reviewed and discussed with patient.      RTC in 1 year     Marly Curran MD

## 2024-05-16 ENCOUNTER — OFFICE VISIT (OUTPATIENT)
Dept: GASTROENTEROLOGY | Facility: CLINIC | Age: 24
End: 2024-05-16
Payer: COMMERCIAL

## 2024-05-16 VITALS
HEART RATE: 83 BPM | DIASTOLIC BLOOD PRESSURE: 97 MMHG | BODY MASS INDEX: 36.39 KG/M2 | WEIGHT: 232.38 LBS | SYSTOLIC BLOOD PRESSURE: 139 MMHG

## 2024-05-16 DIAGNOSIS — K21.9 GASTROESOPHAGEAL REFLUX DISEASE, UNSPECIFIED WHETHER ESOPHAGITIS PRESENT: Primary | ICD-10-CM

## 2024-05-16 DIAGNOSIS — R11.2 NAUSEA AND VOMITING, UNSPECIFIED VOMITING TYPE: ICD-10-CM

## 2024-05-16 DIAGNOSIS — R07.9 CHEST PAIN, UNSPECIFIED TYPE: ICD-10-CM

## 2024-05-16 PROCEDURE — 99204 OFFICE O/P NEW MOD 45 MIN: CPT | Mod: S$GLB,,, | Performed by: STUDENT IN AN ORGANIZED HEALTH CARE EDUCATION/TRAINING PROGRAM

## 2024-05-16 PROCEDURE — 99999 PR PBB SHADOW E&M-EST. PATIENT-LVL III: CPT | Mod: PBBFAC,,, | Performed by: STUDENT IN AN ORGANIZED HEALTH CARE EDUCATION/TRAINING PROGRAM

## 2024-05-16 RX ORDER — OMEPRAZOLE 40 MG/1
40 CAPSULE, DELAYED RELEASE ORAL
Qty: 180 CAPSULE | Refills: 3 | Status: SHIPPED | OUTPATIENT
Start: 2024-05-16 | End: 2025-05-16

## 2024-05-16 NOTE — PATIENT INSTRUCTIONS
-Begin taking omeprazole 40mg twice daily - 30 minutes prior to breakfast and dinner  -We will schedule an EGD

## 2024-05-16 NOTE — PROGRESS NOTES
[facility] Consultation Note    Reason for Consult:  The primary encounter diagnosis was Gastroesophageal reflux disease, unspecified whether esophagitis present. Diagnoses of Nausea and vomiting, unspecified vomiting type and Chest pain, unspecified type were also pertinent to this visit.    PCP:   Marly Curran   [address] / [place] [address]    Referring MD:  Maryl Curran Md  [address] [place] [address]    HPI:  This is a 24 y.o. female here for evaluation of GERD.    She reports a history of GERD for the last year.  She finds that her symptoms occur after meals.  She describes chest pain lasting for a few minutes.  Described as a sharp, steady pain.  This can also occur without any PO intake.  She also describes a burning sensation in her chest.  Does wake at night with symptoms.      She also gets nausea that occurs daily.  Reports intermittent dysphagia to solids and liquids.    She had previously taken pantoprazole without much relief.  She stopped this and tried pepcid.  She also modified her diet to eliminate acidic foods.      Recently, she was prescribed another trial of pantoprazole.    Denies any tobacco use.    No prior endoscopy.    Objective Findings:    Vital Signs:  BP (!) 139/97   Pulse 83   Wt 105.4 kg (232 lb 5.8 oz)   BMI 36.39 kg/m²   Body mass index is 36.39 kg/m².    Physical Exam:  General Appearance: Well appearing in no acute distress        Assessment:  1. Gastroesophageal reflux disease, unspecified whether esophagitis present    2. Nausea and vomiting, unspecified vomiting type    3. Chest pain, unspecified type      The patient presents with ongoing GERD despite PPI therapy.  Will transition to omeprazole 40mg twice daily.  As well, will schedule EGD and plan for esophageal and gastric biopsies.      Also considered esophageal spasms given chest pain.  Will proceed with EGD first.        Follow up in about 3 months  (around 8/16/2024).      Order summary:  Orders Placed This Encounter    omeprazole (PRILOSEC) 40 MG capsule         Thank you so much for allowing me to participate in the care of Ashok Kerr MD

## 2024-05-17 ENCOUNTER — TELEPHONE (OUTPATIENT)
Dept: ENDOSCOPY | Facility: HOSPITAL | Age: 24
End: 2024-05-17
Payer: COMMERCIAL

## 2024-05-17 NOTE — TELEPHONE ENCOUNTER
Contact and spoke with patient to schedule egd procedure. Patient decline to schedule egd procedure at this time stating that she was told by the GI provider that she can take the medication prescribe and follow up in 3 months for a GI clinic appointment and will discuss if she need egd procedure clinic follow up.

## 2024-05-17 NOTE — TELEPHONE ENCOUNTER
"----- Message from Janis Sr sent at 5/16/2024  4:00 PM CDT -----  Regarding: FW: Endo Case Request    ----- Message -----  From: Tab Kerr MD  Sent: 5/16/2024   2:05 PM CDT  To: [facility]  Subject: Endo Case Request                                Procedure: EGD    Diagnosis: GERD and Dysphagia    Procedure Timing: Within 4 weeks (Urgent)    #If within 4 weeks selected, please kel as high priority#    #If greater than 12 weeks, please select "5-12 weeks" and delay sending until 3 months prior to requested date#     Location: [facility]    Additional Scheduling Information: No scheduling concerns    Prep Specifications:N/A    Is the patient taking a GLP-1 Agonist:no    Have you attached a patient to this message: yes  "

## 2024-05-30 ENCOUNTER — HOSPITAL ENCOUNTER (OUTPATIENT)
Dept: RADIOLOGY | Facility: HOSPITAL | Age: 24
Discharge: HOME | End: 2024-05-30
Attending: INTERNAL MEDICINE
Payer: COMMERCIAL

## 2024-05-30 ENCOUNTER — PATIENT MESSAGE (OUTPATIENT)
Dept: GASTROENTEROLOGY | Facility: CLINIC | Age: 24
End: 2024-05-30
Payer: COMMERCIAL

## 2024-05-30 ENCOUNTER — TELEPHONE (OUTPATIENT)
Dept: PRIMARY CARE CLINIC | Facility: CLINIC | Age: 24
End: 2024-05-30
Payer: COMMERCIAL

## 2024-05-30 DIAGNOSIS — D49.7 NEOPLASM OF PITUITARY GLAND: ICD-10-CM

## 2024-05-30 PROCEDURE — 70551 MRI BRAIN STEM W/O DYE: CPT | Mod: 26,,, | Performed by: STUDENT IN AN ORGANIZED HEALTH CARE EDUCATION/TRAINING PROGRAM

## 2024-05-30 PROCEDURE — 70551 MRI BRAIN STEM W/O DYE: CPT | Mod: TC

## 2024-05-30 NOTE — TELEPHONE ENCOUNTER
Pt states she spoke to you at her visit on 5/13/24 about a fall she had last year and her back/neck pain. Did not see mention of this in your note. Pt has seen chiropractor and has seen PT. Pt states she never seen ortho. Pt states she rotates ibuprofen and tylenol but seems to not be helping anymore. Offered NP appt but pt stated she would rather wait on your return. Pt will contact outside Chiropractor to have them fax over records and imaging. Please advise.

## 2024-05-30 NOTE — TELEPHONE ENCOUNTER
----- Message from Denisse Laura sent at 5/30/2024  8:34 AM CDT -----  Contact: [phone]  [email or web address]     Patient is calling for Medical Advice regarding: back and neck pain     How long has patient had these symptoms: since her fall last year     Pharmacy name and phone#:  [id number] - [place], [place] - [address]  [address] [place] [address]  Phone: [phone] Fax: [phone]        Would like response via [employer]:  call back     Comments:    Pt said she is in a lot of pain and nothing is working

## 2024-06-03 ENCOUNTER — TELEPHONE (OUTPATIENT)
Dept: ENDOSCOPY | Facility: HOSPITAL | Age: 24
End: 2024-06-03
Payer: COMMERCIAL

## 2024-06-03 VITALS — BODY MASS INDEX: 35.31 KG/M2 | WEIGHT: 233 LBS | HEIGHT: 68 IN

## 2024-06-03 DIAGNOSIS — K21.9 GASTROESOPHAGEAL REFLUX DISEASE, UNSPECIFIED WHETHER ESOPHAGITIS PRESENT: Primary | ICD-10-CM

## 2024-06-03 DIAGNOSIS — M54.50 CHRONIC LOW BACK PAIN, UNSPECIFIED BACK PAIN LATERALITY, UNSPECIFIED WHETHER SCIATICA PRESENT: Primary | ICD-10-CM

## 2024-06-03 DIAGNOSIS — R13.10 DYSPHAGIA, UNSPECIFIED TYPE: ICD-10-CM

## 2024-06-03 DIAGNOSIS — G89.29 CHRONIC LOW BACK PAIN, UNSPECIFIED BACK PAIN LATERALITY, UNSPECIFIED WHETHER SCIATICA PRESENT: Primary | ICD-10-CM

## 2024-06-03 NOTE — TELEPHONE ENCOUNTER
"----- Message from aTb Kerr MD sent at 5/16/2024  2:05 PM CDT -----  Regarding: Endo Case Request  Procedure: EGD    Diagnosis: GERD and Dysphagia    Procedure Timing: Within 4 weeks (Urgent)    #If within 4 weeks selected, please kel as high priority#    #If greater than 12 weeks, please select "5-12 weeks" and delay sending until 3 months prior to requested date#     Location: [facility]    Additional Scheduling Information: No scheduling concerns    Prep Specifications:N/A    Is the patient taking a GLP-1 Agonist:no    Have you attached a patient to this message: yes  "

## 2024-06-03 NOTE — TELEPHONE ENCOUNTER
Hello, this patient needs to be scheduled. Thank you.     Contacted the patient to schedule an endoscopy procedure(s) . The patient did not answer the call and left a voice message requesting a call back.

## 2024-06-03 NOTE — TELEPHONE ENCOUNTER
Spoke to Ashok to schedule procedure(s) Upper Endoscopy (EGD)       Physician to perform procedure(s) Dr. DANA Kerr  Date of Procedure (s) 6/6/24  Arrival Time 2:00 PM  Time of Procedure(s) 3:00 PM   Location of Procedure(s) [place] 2nd Floor  Type of Rx Prep sent to patient: Other  Instructions provided to patient via [facility]    Patient was informed on the following information and verbalized understanding. Screening questionnaire reviewed with patient and complete. If procedure requires anesthesia, a responsible adult needs to be present to accompany the patient home, patient cannot drive after receiving anesthesia. Appointment details are tentative, especially check-in time. Patient will receive a prep-op call 7 days prior to confirm check-in time for procedure. If applicable the patient should contact their pharmacy to verify Rx for procedure prep is ready for pick-up. Patient was advised to call the scheduling department at [phone] if pharmacy states no Rx is available. Patient was advised to call the endoscopy scheduling department if any questions or concerns arise.       Endoscopy Scheduling Department

## 2024-06-05 ENCOUNTER — ANESTHESIA EVENT (OUTPATIENT)
Dept: ENDOSCOPY | Facility: HOSPITAL | Age: 24
End: 2024-06-05
Payer: COMMERCIAL

## 2024-06-05 NOTE — ANESTHESIA PREPROCEDURE EVALUATION
06/05/2024  Ashok Cabello is a 24 y.o., female.  [facility]  Anesthesia Pre-Operative Evaluation       Patient Name: Ashok Cabello  YOB: 2000  MRN: [id number]  [facility]: [id number]      Code Status: No Order   Date of Procedure: 6/6/2024  Anesthesia: Choice Procedure: Procedure(s) (LRB):  EGD (ESOPHAGOGASTRODUODENOSCOPY) (N/A)  Pre-Operative Diagnosis: Gastroesophageal reflux disease, unspecified whether esophagitis present [K21.9]  Dysphagia, unspecified type [R13.10]  Proceduralist: Surgeons and Role:     * Tab Kerr MD - Primary        SUBJECTIVE:   Ashok Cabello is a 24 y.o. female who  has a past medical history of Allergy, Anxiety, Depression, Seizures, Sleep difficulties, Suicide attempt, and Urticaria..     she has a current medication list which includes the following long-term medication(s): epinephrine, metformin, and omeprazole.     ALLERGIES:     Review of patient's allergies indicates:   Allergen Reactions    Parsley (petroselinum sp) Hives and Rash    Pineapple Hives and Rash     LDA:          Lines/Drains/Airways       None                  Anesthesia Evaluation      Airway   Mallampati: II  Dental      Pulmonary    Cardiovascular     Neuro/Psych    (+) seizuresPsychiatric history: PTST.    GI/Hepatic/Renal      Endo/Other    Abdominal                   MEDICATIONS:     Antibiotics (From admission, onward)      None          VTE Risk Mitigation (From admission, onward)      None              No current facility-administered medications for this encounter.     Current Outpatient Medications   Medication Sig Dispense Refill    EPINEPHrine (EPIPEN 2-TERESITA) 0.3 mg/0.3 mL AtIn Inject 0.3 mLs (0.3 mg total) into the muscle once. for 1 dose 2 each 1    metFORMIN (GLUCOPHAGE-XR) 500 MG ER 24hr tablet  (Patient not taking: Reported on 5/16/2024)       omeprazole (PRILOSEC) 40 MG capsule Take 1 capsule (40 mg total) by mouth 2 (two) times daily before meals. 180 capsule 3          History:   There are no hospital problems to display for this patient.    Surgical History:    has a past surgical history that includes Breast surgery; Breast reconstruction (12/2020); and keratoconus.   Social History:    reports that she is not currently sexually active.  reports that she has never smoked. She has never used smokeless tobacco. She reports current alcohol use. She reports that she does not use drugs.     OBJECTIVE:     Vital Signs (Most Recent):    Vital Signs Range (Last 24H):          There is no height or weight on file to calculate BMI.   Wt Readings from Last 4 Encounters:   06/03/24 105.7 kg (233 lb)   05/16/24 105.4 kg (232 lb 5.8 oz)   05/13/24 106.8 kg (235 lb 7.2 oz)   11/10/23 111.8 kg (246 lb 7.6 oz)       Significant Labs:  Lab Results   Component Value Date    WBC 7.29 09/28/2023    HGB 12.0 09/28/2023    HCT 37.3 09/28/2023     09/28/2023     09/28/2023    K 4.3 09/28/2023     09/28/2023    CREATININE 0.8 09/28/2023    BUN 6 09/28/2023    CO2 25 09/28/2023    GLU 79 09/28/2023    CALCIUM 9.5 09/28/2023    ALKPHOS 65 09/28/2023    ALT 35 09/28/2023    AST 42 (H) 09/28/2023    ALBUMIN 4.2 09/28/2023    HGBA1C 5.8 (H) 07/03/2023     No LMP recorded.  No results found for this or any previous visit (from the past 72 hour(s)).    EKG:   Results for orders placed or performed during the hospital encounter of 09/28/23   EKG 12-lead    Collection Time: 09/28/23 12:06 PM    Narrative    Test Reason : R40.20,    Vent. Rate : 077 BPM     Atrial Rate : 077 BPM     P-R Int : 134 ms          QRS Dur : 070 ms      QT Int : 370 ms       P-R-T Axes : 092 012 011 degrees     QTc Int : 418 ms    Normal sinus rhythm  Normal ECG  When compared with ECG of 18-DEC-2021 14:50,  Nonspecific T wave abnormality no longer evident in Anterior leads  Confirmed by  "ANTHONY LEON, DAVID ([id number]) on 9/28/2023 2:24:53 PM    Referred By: WILIANNILE   SELF           Confirmed By:DAVID CRUZ MD       TTE:  No results found for this or any previous visit.  No results found for: "EF"   No results found for this or any previous visit.  KELLEY:  No results found for this or any previous visit.  Stress Test:  No results found for this or any previous visit.     LHC:  No results found for this or any previous visit.     PFT:  No results found for: "FEV1", "FVC", "UUF6ATF", "TLC", "DLCO"     ASSESSMENT/PLAN:        Pre-op Assessment    I have reviewed the Patient Summary Reports.     I have reviewed the Nursing Notes. I have reviewed the NPO Status.   I have reviewed the Medications.     Review of Systems  Anesthesia Hx:  No problems with previous Anesthesia             Denies Family Hx of Anesthesia complications.    Denies Personal Hx of Anesthesia complications.                    Hematology/Oncology:  Hematology Normal   Oncology Normal                                   EENT/Dental:  EENT/Dental Normal           Cardiovascular:  Cardiovascular Normal                                            Pulmonary:  Pulmonary Normal                       Renal/:  Renal/ Normal                 Hepatic/GI:  Hepatic/GI Normal                 Musculoskeletal:  Musculoskeletal Normal                Neurological:       Seizures                                Endocrine:  Endocrine Normal            Dermatological:  Skin Normal    Psych:  Psychiatric Normal  Psychiatric history: PTST.                Physical Exam    Airway:  Mallampati: II     Anesthesia Plan  Type of Anesthesia, risks & benefits discussed:    Anesthesia Type: Gen Natural Airway  Intra-op Monitoring Plan: Standard ASA Monitors  Post Op Pain Control Plan: multimodal analgesia  Induction:  IV  Informed Consent: Informed consent signed with the Patient and all parties understand the risks and agree with anesthesia plan.  All questions " answered.   ASA Score: 2  Day of Surgery Review of History & Physical: H&P Update referred to the surgeon/provider.    Ready For Surgery From Anesthesia Perspective.     .

## 2024-06-06 ENCOUNTER — ANESTHESIA (OUTPATIENT)
Dept: ENDOSCOPY | Facility: HOSPITAL | Age: 24
End: 2024-06-06
Payer: COMMERCIAL

## 2024-06-06 ENCOUNTER — HOSPITAL ENCOUNTER (OUTPATIENT)
Facility: HOSPITAL | Age: 24
Discharge: HOME | End: 2024-06-06
Attending: STUDENT IN AN ORGANIZED HEALTH CARE EDUCATION/TRAINING PROGRAM | Admitting: STUDENT IN AN ORGANIZED HEALTH CARE EDUCATION/TRAINING PROGRAM
Payer: COMMERCIAL

## 2024-06-06 VITALS
HEART RATE: 72 BPM | RESPIRATION RATE: 16 BRPM | OXYGEN SATURATION: 99 % | WEIGHT: 233 LBS | BODY MASS INDEX: 36.57 KG/M2 | SYSTOLIC BLOOD PRESSURE: 148 MMHG | HEIGHT: 67 IN | TEMPERATURE: 98 F | DIASTOLIC BLOOD PRESSURE: 91 MMHG

## 2024-06-06 DIAGNOSIS — K21.9 GASTROESOPHAGEAL REFLUX DISEASE, UNSPECIFIED WHETHER ESOPHAGITIS PRESENT: Primary | ICD-10-CM

## 2024-06-06 DIAGNOSIS — R13.10 DYSPHAGIA: ICD-10-CM

## 2024-06-06 LAB
B-HCG UR QL: NEGATIVE
CTP QC/QA: YES

## 2024-06-06 PROCEDURE — 63600175 PHARM REV CODE 636 W HCPCS: Performed by: NURSE ANESTHETIST, CERTIFIED REGISTERED

## 2024-06-06 PROCEDURE — 43239 EGD BIOPSY SINGLE/MULTIPLE: CPT | Mod: ,,, | Performed by: STUDENT IN AN ORGANIZED HEALTH CARE EDUCATION/TRAINING PROGRAM

## 2024-06-06 PROCEDURE — 81025 URINE PREGNANCY TEST: CPT | Performed by: STUDENT IN AN ORGANIZED HEALTH CARE EDUCATION/TRAINING PROGRAM

## 2024-06-06 PROCEDURE — 88305 TISSUE EXAM BY PATHOLOGIST: CPT | Mod: 26,,, | Performed by: PATHOLOGY

## 2024-06-06 PROCEDURE — 43239 EGD BIOPSY SINGLE/MULTIPLE: CPT | Performed by: STUDENT IN AN ORGANIZED HEALTH CARE EDUCATION/TRAINING PROGRAM

## 2024-06-06 PROCEDURE — 37000009 HC ANESTHESIA EA ADD 15 MINS: Performed by: STUDENT IN AN ORGANIZED HEALTH CARE EDUCATION/TRAINING PROGRAM

## 2024-06-06 PROCEDURE — 88305 TISSUE EXAM BY PATHOLOGIST: CPT | Mod: 59 | Performed by: PATHOLOGY

## 2024-06-06 PROCEDURE — 25000003 PHARM REV CODE 250: Performed by: NURSE ANESTHETIST, CERTIFIED REGISTERED

## 2024-06-06 PROCEDURE — 37000008 HC ANESTHESIA 1ST 15 MINUTES: Performed by: STUDENT IN AN ORGANIZED HEALTH CARE EDUCATION/TRAINING PROGRAM

## 2024-06-06 PROCEDURE — 27201012 HC FORCEPS, HOT/COLD, DISP: Performed by: STUDENT IN AN ORGANIZED HEALTH CARE EDUCATION/TRAINING PROGRAM

## 2024-06-06 PROCEDURE — D9220A PRA ANESTHESIA: Mod: ,,, | Performed by: NURSE ANESTHETIST, CERTIFIED REGISTERED

## 2024-06-06 RX ORDER — SODIUM CHLORIDE 9 MG/ML
INJECTION, SOLUTION INTRAVENOUS CONTINUOUS
Status: DISCONTINUED | OUTPATIENT
Start: 2024-06-06 | End: 2024-06-06 | Stop reason: HOSPADM

## 2024-06-06 RX ORDER — PROPOFOL 10 MG/ML
VIAL (ML) INTRAVENOUS CONTINUOUS PRN
Status: DISCONTINUED | OUTPATIENT
Start: 2024-06-06 | End: 2024-06-06

## 2024-06-06 RX ORDER — LIDOCAINE HYDROCHLORIDE 20 MG/ML
INJECTION INTRAVENOUS
Status: DISCONTINUED | OUTPATIENT
Start: 2024-06-06 | End: 2024-06-06

## 2024-06-06 RX ORDER — PROPOFOL 10 MG/ML
VIAL (ML) INTRAVENOUS
Status: DISCONTINUED | OUTPATIENT
Start: 2024-06-06 | End: 2024-06-06

## 2024-06-06 RX ADMIN — GLYCOPYRROLATE 0.2 MG: 0.2 INJECTION, SOLUTION INTRAMUSCULAR; INTRAVENOUS at 12:06

## 2024-06-06 RX ADMIN — PROPOFOL 100 MG: 10 INJECTION, EMULSION INTRAVENOUS at 12:06

## 2024-06-06 RX ADMIN — PROPOFOL 200 MCG/KG/MIN: 10 INJECTION, EMULSION INTRAVENOUS at 12:06

## 2024-06-06 RX ADMIN — LIDOCAINE HYDROCHLORIDE 100 MG: 20 INJECTION INTRAVENOUS at 12:06

## 2024-06-06 RX ADMIN — SODIUM CHLORIDE: 0.9 INJECTION, SOLUTION INTRAVENOUS at 12:06

## 2024-06-06 NOTE — ANESTHESIA POSTPROCEDURE EVALUATION
Anesthesia Post Evaluation    Patient: Carcia Raymonvil    Procedure(s) Performed: Procedure(s) (LRB):  EGD (ESOPHAGOGASTRODUODENOSCOPY) (N/A)    Final Anesthesia Type: general      Patient location during evaluation: PACU  Patient participation: Yes- Able to Participate  Level of consciousness: awake and alert  Post-procedure vital signs: reviewed and stable  Pain management: adequate  Airway patency: patent    PONV status at discharge: No PONV  Anesthetic complications: no      Cardiovascular status: stable  Respiratory status: spontaneous ventilation  Hydration status: euvolemic  Follow-up not needed.          Vitals Value Taken Time   /91 06/06/24 1327   Temp 36.8 °C (98.2 °F) 06/06/24 1305   Pulse 72 06/06/24 1327   Resp 16 06/06/24 1327   SpO2 99 % 06/06/24 1327         Event Time   Out of Recovery 13:20:00         Pain/Dakota Score: Dakota Score: 10 (6/6/2024  1:20 PM)

## 2024-06-06 NOTE — H&P
Short Stay Endoscopy History and Physical    PCP - Marly Curran MD  Referring Physician - Tab Kerr MD  [address] [facility] of [facility] 4th floor, [place] [address]    Procedure - EGD  ASA - per anesthesia  Mallampati - per anesthesia  History of Anesthesia problems - no  Family history Anesthesia problems -  no   Plan of anesthesia - General    HPI  24 y.o. female  Reason for procedure:  Gastroesophageal reflux disease, unspecified whether esophagitis present [K21.9]  Dysphagia, unspecified type [R13.10]      ROS:  Constitutional: No fevers, chills, No weight loss  CV: No chest pain  Pulm: No cough, No shortness of breath  GI: see HPI    Medical History:  has a past medical history of Allergy, Anxiety, Depression, Seizures, Sleep difficulties, Suicide attempt, and Urticaria.    Surgical History:  has a past surgical history that includes Breast surgery; Breast reconstruction (12/2020); and keratoconus.    Family History: family history includes Diabetes in her father and maternal grandmother; Heart attack in her father and paternal grandmother; Heart failure in her father and paternal grandmother; Hyperlipidemia in her paternal grandmother; Hypertension in her father, maternal grandmother, mother, and paternal grandmother; Stomach cancer in her maternal grandmother; Thalassemia in her maternal grandmother..    Social History:  reports that she has never smoked. She has never used smokeless tobacco. She reports current alcohol use. She reports that she does not use drugs.    Review of patient's allergies indicates:   Allergen Reactions    Parsley (petroselinum sp) Hives and Rash    Pineapple Hives and Rash       Medications:   Medications Prior to Admission   Medication Sig Dispense Refill Last Dose    EPINEPHrine (EPIPEN 2-TERESITA) 0.3 mg/0.3 mL AtIn Inject 0.3 mLs (0.3 mg total) into the muscle once. for 1 dose 2 each 1     metFORMIN  (GLUCOPHAGE-XR) 500 MG ER 24hr tablet  (Patient not taking: Reported on 5/16/2024)       omeprazole (PRILOSEC) 40 MG capsule Take 1 capsule (40 mg total) by mouth 2 (two) times daily before meals. 180 capsule 3        Physical Exam:    Vital Signs: There were no vitals filed for this visit.    General Appearance: Well appearing in no acute distress  Abdomen: Soft, non tender, non distended with normal bowel sounds, no masses      Labs:  Lab Results   Component Value Date    WBC 7.29 09/28/2023    HGB 12.0 09/28/2023    HCT 37.3 09/28/2023     09/28/2023    CHOL 188 04/06/2023    TRIG 74 04/06/2023    HDL 44 (L) 04/06/2023    ALT 35 09/28/2023    AST 42 (H) 09/28/2023     09/28/2023    K 4.3 09/28/2023     09/28/2023    CREATININE 0.8 09/28/2023    BUN 6 09/28/2023    CO2 25 09/28/2023    TSH 2.919 07/03/2023    HGBA1C 5.8 (H) 07/03/2023       I have explained the risks and benefits of this endoscopic procedure to the patient including but not limited to bleeding, inflammation, infection, perforation, and death.      Tab Kerr MD

## 2024-06-06 NOTE — PROVATION PATIENT INSTRUCTIONS
Discharge Summary/Instructions after an Endoscopic Procedure  Patient Name: Ashok Cabello  Patient MRN: [id number]  Patient YOB: 2000 Thursday, June 6, 2024  Tab Kerr MD  Dear patient,  As a result of recent federal legislation (The Federal Cures Act), you may   receive lab or pathology results from your procedure in your [facility]   account before your physician is able to contact you. Your physician or   their representative will relay the results to you with their   recommendations at their soonest availability.  Thank you,  RESTRICTIONS:  During your procedure today, you received medications for sedation.  These   medications may affect your judgment, balance and coordination.  Therefore,   for 24 hours, you have the following restrictions:   - DO NOT drive a car, operate machinery, make legal/financial decisions,   sign important papers or drink alcohol.    ACTIVITY:  Today: no heavy lifting, straining or running due to procedural   sedation/anesthesia.  The following day: return to full activity including work.  DIET:  Eat and drink normally unless instructed otherwise.     TREATMENT FOR COMMON SIDE EFFECTS:  - Mild abdominal pain, nausea, belching, bloating or excessive gas:  rest,   eat lightly and use a heating pad.  - Sore Throat: treat with throat lozenges and/or gargle with warm salt   water.  - Because air was used during the procedure, expelling large amounts of air   from your rectum or belching is normal.  - If a bowel prep was taken, you may not have a bowel movement for 1-3 days.    This is normal.  SYMPTOMS TO WATCH FOR AND REPORT TO YOUR PHYSICIAN:  1. Abdominal pain or bloating, other than gas cramps.  2. Chest pain.  3. Back pain.  4. Signs of infection such as: chills or fever occurring within 24 hours   after the procedure.  5. Rectal bleeding, which would show as bright red, maroon, or black stools.   (A tablespoon of blood from the rectum is not serious,  especially if   hemorrhoids are present.)  6. Vomiting.  7. Weakness or dizziness.  GO DIRECTLY TO THE NEAREST EMERGENCY ROOM IF YOU HAVE ANY OF THE FOLLOWING:      Difficulty breathing              Chills and/or fever over 101 F   Persistent vomiting and/or vomiting blood   Severe abdominal pain   Severe chest pain   Black, tarry stools   Bleeding- more than one tablespoon   Any other symptom or condition that you feel may need urgent attention  Your doctor recommends these additional instructions:  If any biopsies were taken, your doctors clinic will contact you in 1 to 2   weeks with any results.  - Discharge patient to home.   - Patient has a contact number available for emergencies.  The signs and   symptoms of potential delayed complications were discussed with the   patient.  Return to normal activities tomorrow.  Written discharge   instructions were provided to the patient.   - The findings and recommendations were discussed with the patient.   - Await pathology results.  For questions, problems or results please call your physician - Tab Kerr MD at Work:  [phone].  [facility], EMERGENCY ROOM PHONE NUMBER: [phone]  IF A COMPLICATION OR EMERGENCY SITUATION ARISES AND YOU ARE UNABLE TO REACH   YOUR PHYSICIAN - GO DIRECTLY TO THE EMERGENCY ROOM.  Tab Kerr MD  6/6/2024 1:02:24 PM  This report has been verified and signed electronically.  Dear patient,  As a result of recent federal legislation (The Federal Cures Act), you may   receive lab or pathology results from your procedure in your [facility]   account before your physician is able to contact you. Your physician or   their representative will relay the results to you with their   recommendations at their soonest availability.  Thank you,  PROVATION

## 2024-06-06 NOTE — TRANSFER OF CARE
"Anesthesia Transfer of Care Note    Patient: Ashok Cabello    Procedure(s) Performed: Procedure(s) (LRB):  EGD (ESOPHAGOGASTRODUODENOSCOPY) (N/A)    Patient location: PACU    Anesthesia Type: general    Transport from OR: Transported from OR on room air with adequate spontaneous ventilation    Post pain: adequate analgesia    Post assessment: no apparent anesthetic complications and tolerated procedure well    Post vital signs: stable    Level of consciousness: responds to stimulation and sedated    Nausea/Vomiting: no nausea/vomiting    Complications: none    Transfer of care protocol was followed      Last vitals: Visit Vitals  /74 (BP Location: Left arm, Patient Position: Lying)   Pulse 69   Temp 36.7 °C (98.1 °F) (Temporal)   Resp 16   Ht 5' 7" (1.702 m)   Wt 105.7 kg (233 lb)   SpO2 100%   Breastfeeding No   BMI 36.49 kg/m²     "

## 2024-06-10 LAB
FINAL PATHOLOGIC DIAGNOSIS: NORMAL
GROSS: NORMAL
Lab: NORMAL

## 2024-07-03 ENCOUNTER — TELEPHONE (OUTPATIENT)
Dept: GASTROENTEROLOGY | Facility: CLINIC | Age: 24
End: 2024-07-03
Payer: COMMERCIAL

## 2024-08-22 ENCOUNTER — OFFICE VISIT (OUTPATIENT)
Dept: GASTROENTEROLOGY | Facility: CLINIC | Age: 24
End: 2024-08-22
Payer: COMMERCIAL

## 2024-08-22 VITALS
WEIGHT: 235.69 LBS | SYSTOLIC BLOOD PRESSURE: 145 MMHG | DIASTOLIC BLOOD PRESSURE: 92 MMHG | HEART RATE: 66 BPM | BODY MASS INDEX: 36.91 KG/M2

## 2024-08-22 DIAGNOSIS — R11.2 NAUSEA AND VOMITING, UNSPECIFIED VOMITING TYPE: Primary | ICD-10-CM

## 2024-08-22 DIAGNOSIS — G89.29 CHRONIC BACK PAIN, UNSPECIFIED BACK LOCATION, UNSPECIFIED BACK PAIN LATERALITY: ICD-10-CM

## 2024-08-22 DIAGNOSIS — M54.9 CHRONIC BACK PAIN, UNSPECIFIED BACK LOCATION, UNSPECIFIED BACK PAIN LATERALITY: ICD-10-CM

## 2024-08-22 DIAGNOSIS — R10.9 ABDOMINAL PAIN, UNSPECIFIED ABDOMINAL LOCATION: ICD-10-CM

## 2024-08-22 PROCEDURE — 99214 OFFICE O/P EST MOD 30 MIN: CPT | Mod: S$GLB,,, | Performed by: STUDENT IN AN ORGANIZED HEALTH CARE EDUCATION/TRAINING PROGRAM

## 2024-08-22 PROCEDURE — 99999 PR PBB SHADOW E&M-EST. PATIENT-LVL III: CPT | Mod: PBBFAC,,, | Performed by: STUDENT IN AN ORGANIZED HEALTH CARE EDUCATION/TRAINING PROGRAM

## 2024-08-22 RX ORDER — ONDANSETRON 4 MG/1
4 TABLET, ORALLY DISINTEGRATING ORAL EVERY 6 HOURS PRN
Qty: 30 TABLET | Refills: 3 | Status: SHIPPED | OUTPATIENT
Start: 2024-08-22

## 2024-08-22 NOTE — PROGRESS NOTES
"    [facility] Follow-UP Note    Reason for Follow-Up:  The primary encounter diagnosis was Nausea and vomiting, unspecified vomiting type. Diagnoses of Abdominal pain, unspecified abdominal location and Chronic back pain, unspecified back location, unspecified back pain laterality were also pertinent to this visit.    PCP:   Marly Curran           HPI:  This is a 24 y.o. female last seen in GI clinic on May 16 for GERD.  She was scheduled for an EGD and transitioned to omeprazole 40mg twice daily.    Interval History:  EGD done 6/6/24 was normal.  Esophageal and duodenal biopsies were unrevealing.    She notes that she continues to feel unwell.  She has constant nausea, frequent vomiting, and burning chest pain.  She has associated soreness in her throat that is worse with swallowing.      He most concerning symptom is the ongoing nausea and chest pain.  She has been unable to identify any alleviating factors.  Pain and nausea are unrelated to meals.      Denies any improvement with PPI therapy.    She does have occasional symptom free days, but also comments that she has severe back pain that makes her feel unwell.  She is not taking any opioids for her back pain.      She has a bloodwork order, but is electing to defer due to the fact that she is a "hard stick."        Objective Findings:    Vital Signs:  BP (!) 145/92   Pulse 66   Wt 106.9 kg (235 lb 10.8 oz)   BMI 36.91 kg/m²   Body mass index is 36.91 kg/m².    Physical Exam:  General Appearance: Well appearing in no acute distress    Assessment:  1. Nausea and vomiting, unspecified vomiting type    2. Abdominal pain, unspecified abdominal location    3. Chronic back pain, unspecified back location, unspecified back pain laterality      At present, it is not entirely clear why the patient has ongoing abdominal pain nausea and vomiting.  I suspect there is a functional component to her symptoms given that she mentioned she is " particularly stressed today and symptoms seem to be worsened.  Her upper endoscopy was completely normal and she has not responded to PPI therapy.    I recommended that we obtain labs today, specifically a CBC, CMP, and hemoglobin A1c.  However, she is a hard stick and does not want to have labs taken if they do not have a vein finder.  Given this, she will defer having her blood drawn today at [facility].    She will discuss this further with her primary care provider to see if there are any alternative options for having her blood drawn at a more experienced location.  When I asked her where she would prefer to have this done she mentioned the emergency department, but I do not think this is possible for an outpatient blood draw.    I will proceed with a right upper quadrant ultrasound to assess for gallbladder disease and a gastric emptying study to rule out delayed motility.    She will follow up in 3 months and we will reassess her symptoms.    Follow up in about 3 months (around 11/22/2024).      Order summary:  Orders Placed This Encounter    US Abdomen Limited (GALLBLADDER)    NM Gastric Emptying    Ambulatory referral/consult to Pain Clinic    ondansetron (ZOFRAN-ODT) 4 MG TbDL         Thank you so much for allowing me to participate in the care of Erendiraia Destiney Kerr MD

## 2024-08-29 ENCOUNTER — OFFICE VISIT (OUTPATIENT)
Dept: PAIN MEDICINE | Facility: CLINIC | Age: 24
End: 2024-08-29
Payer: COMMERCIAL

## 2024-08-29 VITALS
DIASTOLIC BLOOD PRESSURE: 90 MMHG | HEIGHT: 67 IN | BODY MASS INDEX: 36.57 KG/M2 | WEIGHT: 233 LBS | HEART RATE: 72 BPM | SYSTOLIC BLOOD PRESSURE: 140 MMHG

## 2024-08-29 DIAGNOSIS — G89.29 CHRONIC BACK PAIN, UNSPECIFIED BACK LOCATION, UNSPECIFIED BACK PAIN LATERALITY: ICD-10-CM

## 2024-08-29 DIAGNOSIS — M79.7 FIBROMYALGIA: ICD-10-CM

## 2024-08-29 DIAGNOSIS — M54.9 CHRONIC BACK PAIN, UNSPECIFIED BACK LOCATION, UNSPECIFIED BACK PAIN LATERALITY: ICD-10-CM

## 2024-08-29 DIAGNOSIS — R52 WHOLE BODY PAIN: Primary | ICD-10-CM

## 2024-08-29 PROCEDURE — 99999 PR PBB SHADOW E&M-EST. PATIENT-LVL III: CPT | Mod: PBBFAC,,, | Performed by: STUDENT IN AN ORGANIZED HEALTH CARE EDUCATION/TRAINING PROGRAM

## 2024-08-29 PROCEDURE — 99204 OFFICE O/P NEW MOD 45 MIN: CPT | Mod: S$GLB,,, | Performed by: STUDENT IN AN ORGANIZED HEALTH CARE EDUCATION/TRAINING PROGRAM

## 2024-08-29 RX ORDER — CELECOXIB 200 MG/1
200 CAPSULE ORAL 2 TIMES DAILY
Qty: 60 CAPSULE | Refills: 1 | Status: SHIPPED | OUTPATIENT
Start: 2024-08-29 | End: 2024-08-29

## 2024-08-29 RX ORDER — CELECOXIB 200 MG/1
200 CAPSULE ORAL 2 TIMES DAILY
Qty: 60 CAPSULE | Refills: 1 | Status: SHIPPED | OUTPATIENT
Start: 2024-08-29 | End: 2024-10-28

## 2024-08-29 RX ORDER — DULOXETIN HYDROCHLORIDE 30 MG/1
60 CAPSULE, DELAYED RELEASE ORAL DAILY
Qty: 60 CAPSULE | Refills: 1 | Status: SHIPPED | OUTPATIENT
Start: 2024-08-29 | End: 2024-10-28

## 2024-08-29 RX ORDER — DULOXETIN HYDROCHLORIDE 30 MG/1
60 CAPSULE, DELAYED RELEASE ORAL DAILY
Qty: 60 CAPSULE | Refills: 1 | Status: SHIPPED | OUTPATIENT
Start: 2024-08-29 | End: 2024-08-29

## 2024-08-29 RX ORDER — BACLOFEN 10 MG/1
10 TABLET ORAL 2 TIMES DAILY PRN
Qty: 60 TABLET | Refills: 1 | Status: SHIPPED | OUTPATIENT
Start: 2024-08-29 | End: 2024-08-29

## 2024-08-29 RX ORDER — BACLOFEN 10 MG/1
10 TABLET ORAL 2 TIMES DAILY PRN
Qty: 60 TABLET | Refills: 1 | Status: SHIPPED | OUTPATIENT
Start: 2024-08-29 | End: 2024-10-28
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
## 2024-08-30 ENCOUNTER — HOSPITAL ENCOUNTER (OUTPATIENT)
Dept: RADIOLOGY | Facility: HOSPITAL | Age: 24
Discharge: HOME | End: 2024-08-30
Attending: STUDENT IN AN ORGANIZED HEALTH CARE EDUCATION/TRAINING PROGRAM
Payer: COMMERCIAL

## 2024-08-30 PROCEDURE — 76705 ECHO EXAM OF ABDOMEN: CPT | Mod: TC

## 2024-08-30 PROCEDURE — 76705 ECHO EXAM OF ABDOMEN: CPT | Mod: 26,,, | Performed by: STUDENT IN AN ORGANIZED HEALTH CARE EDUCATION/TRAINING PROGRAM

## 2024-09-10 ENCOUNTER — TELEPHONE (OUTPATIENT)
Dept: BEHAVIORAL HEALTH | Facility: CLINIC | Age: 24
End: 2024-09-10
Payer: COMMERCIAL

## 2024-09-10 ENCOUNTER — PATIENT MESSAGE (OUTPATIENT)
Dept: BEHAVIORAL HEALTH | Facility: CLINIC | Age: 24
End: 2024-09-10
Payer: COMMERCIAL

## 2024-09-10 ENCOUNTER — OFFICE VISIT (OUTPATIENT)
Dept: INTERNAL MEDICINE | Facility: CLINIC | Age: 24
End: 2024-09-10
Payer: COMMERCIAL

## 2024-09-10 VITALS
OXYGEN SATURATION: 99 % | HEART RATE: 67 BPM | WEIGHT: 232.81 LBS | SYSTOLIC BLOOD PRESSURE: 130 MMHG | DIASTOLIC BLOOD PRESSURE: 72 MMHG | BODY MASS INDEX: 36.54 KG/M2 | HEIGHT: 67 IN

## 2024-09-10 DIAGNOSIS — F43.20 ADJUSTMENT DISORDER, UNSPECIFIED TYPE: Primary | ICD-10-CM

## 2024-09-10 PROCEDURE — 99999 PR PBB SHADOW E&M-EST. PATIENT-LVL IV: CPT | Mod: PBBFAC,,, | Performed by: INTERNAL MEDICINE

## 2024-09-10 PROCEDURE — 99214 OFFICE O/P EST MOD 30 MIN: CPT | Mod: S$GLB,,, | Performed by: INTERNAL MEDICINE

## 2024-09-10 RX ORDER — HYDROXYZINE HYDROCHLORIDE 25 MG/1
25 TABLET, FILM COATED ORAL 2 TIMES DAILY PRN
Qty: 30 TABLET | Refills: 0 | Status: SHIPPED | OUTPATIENT
Start: 2024-09-10

## 2024-09-10 NOTE — PROGRESS NOTES
Behavioral Health Community Health Worker  Initial Assessment  Completed by:    Earl White    Date:  9/10/2024    Patient Enrollment in Behavioral Health Program:  Patient verbalized understanding of Behavioral Health Integration services to include:  Patient understands that CHW, LCSW, PharmD and consulting Psychiatrist are members of the care team working collaboratively with his/her primary care provider: Yes  Patient understands that activation of their [facility] patient portal account is required for accessing the full scope of team services: Yes  Patient understands that some counseling sessions may occur via video: Yes  Clinic visits with the psychiatrist may be subject to a co-pay based on your insurance: Yes  Patient consents to enroll in BHI program: Yes    Assessments     Single Item Health Literacy Scale:       Promis 10:       Depression PHQ:      9/10/2024     1:58 PM 8/29/2024    11:26 AM 5/13/2024    10:00 AM 7/3/2023    12:35 PM 3/29/2023     9:59 AM 5/31/2021     3:36 PM   PHQ-9 Depression Patient Health Questionnaire   Over the last two weeks how often have you been bothered by little interest or pleasure in doing things 3 0 0 0 0 3   Over the last two weeks how often have you been bothered by feeling down, depressed or hopeless 3 0 0  0 2   Over the last two weeks how often have you been bothered by trouble falling or staying asleep, or sleeping too much 3     3   Over the last two weeks how often have you been bothered by feeling tired or having little energy 3     3   Over the last two weeks how often have you been bothered by a poor appetite or overeating 3     2   Over the last two weeks how often have you been bothered by feeling bad about yourself - or that you are a failure or have let yourself or your family down 1     2   Over the last two weeks how often have you been bothered by trouble concentrating on things, such as reading the newspaper or watching television 3     2   Over the  "last two weeks how often have you been bothered by moving or speaking so slowly that other people could have noticed. 3     1   Over the last two weeks how often have you been bothered by thoughts that you would be better off dead, or of hurting yourself 0     2   If you checked off any problems, how difficult have these problems made it for you to do your work, take care of things at home or get along with other people? Extremely difficult     Very difficult   PHQ-9 Score 22     20          Generalized Anxiety Disorder 7-Item Scale:      9/10/2024     2:00 PM   GAD7   1. Feeling nervous, anxious, or on edge? 3   2. Not being able to stop or control worrying? 3   3. Worrying too much about different things? 3   4. Trouble relaxing? 3   5. Being so restless that it is hard to sit still? 3   6. Becoming easily annoyed or irritable? 3   7. Feeling afraid as if something awful might happen? 3   8. If you checked off any problems, how difficult have these problems made it for you to do your work, take care of things at home, or get along with other people? 3   TANK-7 Score 21       History     Social History     Socioeconomic History    Marital status: Single    Number of children: 0   Tobacco Use    Smoking status: Never    Smokeless tobacco: Never   Substance and Sexual Activity    Alcohol use: Yes     Comment: once every few months     Drug use: Never    Sexual activity: Not Currently       Call Summary   Patient was referred to the BHI (Non-opioid) program by Primary Care Provider, Dr. Montanez CHW contacted Ashok Cabello who reports adjustment disorder that limits [his/her] activities of daily living (ADLs).   Patient scored "22" on the PHQ9 and "21" on the TANK 7. Based on these scores patient is eligible for the Behavioral health Integration (Non-opioid) Program. LEELAW completed the intake and scheduled an appointment for patient with Dr Rochelle Bowman, LPC,PhD on 9/27/24.          "

## 2024-09-10 NOTE — PROGRESS NOTES
Subjective:       Patient ID: Ashok Cabello is a 24 y.o. female.    Chief Complaint: Anxiety    Here for urgent care --  Dealing with anxiety attacks.  She has had worsened panic attacks over the last few weeks.  She denies any trauma or concern for her safety.  She has a supportive male partner.  Some anxiety from her new job.  In the past she is seen mental health for anxiety and depression symptoms.  She denies SI.  She does not drink excessive alcohol or use drugs.      Review of Systems   Psychiatric/Behavioral:  Positive for sleep disturbance. Negative for agitation, behavioral problems, confusion, decreased concentration, dysphoric mood, hallucinations and suicidal ideas. The patient is nervous/anxious.            Objective:      Physical Exam  Constitutional:       General: She is not in acute distress.     Appearance: Normal appearance. She is well-developed. She is not ill-appearing, toxic-appearing or diaphoretic.      Comments: Well appearing, breathing comfortably, speaking full sentences, no coughing during telemed encounter     Pulmonary:      Effort: Pulmonary effort is normal. No respiratory distress.   Skin:     Findings: No rash.   Neurological:      Mental Status: She is alert and oriented to person, place, and time.   Psychiatric:         Behavior: Behavior normal.         Thought Content: Thought content normal.         Judgment: Judgment normal.         Assessment:       1. Adjustment disorder, unspecified type        Plan:       Ashok was seen today for anxiety.    Diagnoses and all orders for this visit:    Adjustment disorder, unspecified type  -     Ambulatory referral/consult to Primary Care Behavioral Health (Non-Opioids); Future  -     hydrOXYzine HCL (ATARAX) 25 MG tablet; Take 1 tablet (25 mg total) by mouth 2 (two) times daily as needed for Anxiety.  She does not think symptoms worsened after starting duloxetine which she just started about a week ago.  discussed and educated on  relaxation breathing, she will continue previous grounding exercises he has learned in the past.  I educated pt to call 24hours per day our clinic or go to ER if any thoughts about harming self or worsening anxiety/depression symptoms.  She will also seek possible counseling therapy from her recent grad school through [employer].    Over 25 minutes spent with patient and majority in counseling and patient education.      Visit today included increased complexity associated with the care of the episodic problem  addressed and managing the longitudinal care of the patient due to the serious and/or complex managed problem(s) .    No follow-ups on file.    Future Appointments   Date Time Provider Department Center   9/27/2024  8:00 AM [facility] NM3 MG1 400LB LIMIT [facility] NUC[address]   9/27/2024  1:30 PM Rochelle Bowman, DON [facility] [place]   11/14/2024 11:30 AM Quan Muniz DO [facility] [place]

## 2024-09-22 DIAGNOSIS — M79.7 FIBROMYALGIA: ICD-10-CM

## 2024-09-22 DIAGNOSIS — R52 WHOLE BODY PAIN: ICD-10-CM

## 2024-09-23 RX ORDER — DULOXETIN HYDROCHLORIDE 60 MG/1
60 CAPSULE, DELAYED RELEASE ORAL DAILY
Qty: 90 CAPSULE | Refills: 1 | Status: SHIPPED | OUTPATIENT
Start: 2024-09-23 | End: 2025-03-22

## 2024-09-23 RX ORDER — BACLOFEN 10 MG/1
10 TABLET ORAL 2 TIMES DAILY PRN
Qty: 180 TABLET | Refills: 1 | Status: SHIPPED | OUTPATIENT
Start: 2024-09-23 | End: 2025-03-22

## 2024-09-27 ENCOUNTER — HOSPITAL ENCOUNTER (OUTPATIENT)
Dept: RADIOLOGY | Facility: HOSPITAL | Age: 24
Discharge: HOME | End: 2024-09-27
Attending: STUDENT IN AN ORGANIZED HEALTH CARE EDUCATION/TRAINING PROGRAM
Payer: COMMERCIAL

## 2024-09-27 ENCOUNTER — CLINICAL SUPPORT (OUTPATIENT)
Dept: BEHAVIORAL HEALTH | Facility: CLINIC | Age: 24
End: 2024-09-27
Payer: COMMERCIAL

## 2024-09-27 DIAGNOSIS — F41.1 GAD (GENERALIZED ANXIETY DISORDER): Primary | ICD-10-CM

## 2024-09-27 DIAGNOSIS — F33.1 MDD (MAJOR DEPRESSIVE DISORDER), RECURRENT EPISODE, MODERATE: ICD-10-CM

## 2024-09-27 DIAGNOSIS — F43.20 ADJUSTMENT DISORDER, UNSPECIFIED TYPE: ICD-10-CM

## 2024-09-27 DIAGNOSIS — F43.10 PTSD (POST-TRAUMATIC STRESS DISORDER): ICD-10-CM

## 2024-09-27 DIAGNOSIS — R10.9 ABDOMINAL PAIN, UNSPECIFIED ABDOMINAL LOCATION: ICD-10-CM

## 2024-09-27 DIAGNOSIS — R11.2 NAUSEA AND VOMITING, UNSPECIFIED VOMITING TYPE: ICD-10-CM

## 2024-09-27 PROCEDURE — 78264 GASTRIC EMPTYING IMG STUDY: CPT | Mod: TC

## 2024-09-27 PROCEDURE — A9541 TC99M SULFUR COLLOID: HCPCS | Performed by: STUDENT IN AN ORGANIZED HEALTH CARE EDUCATION/TRAINING PROGRAM

## 2024-09-27 PROCEDURE — 78264 GASTRIC EMPTYING IMG STUDY: CPT | Mod: 26,,, | Performed by: NUCLEAR MEDICINE

## 2024-09-27 RX ORDER — TECHNETIUM TC 99M SULFUR COLLOID 2 MG
1 KIT MISCELLANEOUS
Status: COMPLETED | OUTPATIENT
Start: 2024-09-27 | End: 2024-09-27

## 2024-09-27 RX ADMIN — TECHNETIUM TC 99M SULFUR COLLOID KIT 0.9 MILLICURIE: KIT at 09:09

## 2024-09-27 NOTE — PROGRESS NOTES
Primary Care Behavioral Health Integration: Initial  Date:  9/27/2024  Referral Source:  Marly Curran MD  Length of Appointment: 30  The patient location is:  Home  The patient phone number is: [phone]  Visit type: Virtual visit with synchronous audio and video    Each patient to whom he or she provides medical services by telemedicine is:  (1) informed of the relationship between the physician and patient and the respective role of any other health care provider with respect to management of the patient; and (2) notified that he or she may decline to receive medical services by telemedicine and may withdraw from such care at any time.    Chief Complaint/Reason for Encounter:  Anxiety    History of Present Illness: Ashok Cabello, a 24 y.o. female referred by Marly Curran MD.  Patient was seen, examined and chart was reviewed. Met with patient.     Current Session: Patient reported having a fall a year ago and fell in the shower. Has bulging disks, pinched nerves and hip is out of alignment. Has to go to PT and see a chiropractor. Trying to try the conservative way. Was in grad school at the time. Pt feels detached from reality. Is in pain everyday. Is working as a  for 3 months. Stress and pain puts a toil on her at job. Describes herself as a nice and sweet. Says people walk all over her. Comes from a small community and was sheltered. Pt grew up in [place]. Says she feels like an empty shell. Describ[place] family being [place] and they were very strict. Also, southern [place]vism. Pt couldn't do anything except school and Rastafari. Attempted suicide in high school. Took a whole bottle of Alieve.     Past Medical History:   Diagnosis Date    Allergy     Anxiety     Depression     Seizures     epilepsy at [age]    Sleep difficulties     Suicide attempt     Urticaria          Current Outpatient Medications:     baclofen (LIORESAL) 10 MG tablet,  Take 1 tablet (10 mg total) by mouth 2 (two) times daily as needed (muscle spasms)., Disp: 180 tablet, Rfl: 1    celecoxib (CELEBREX) 200 MG capsule, Take 1 capsule (200 mg total) by mouth 2 (two) times daily., Disp: 60 capsule, Rfl: 1    DULoxetine (CYMBALTA) 60 MG capsule, Take 1 capsule (60 mg total) by mouth once daily., Disp: 90 capsule, Rfl: 1    EPINEPHrine (EPIPEN 2-TERESITA) 0.3 mg/0.3 mL AtIn, Inject 0.3 mLs (0.3 mg total) into the muscle once. for 1 dose, Disp: 2 each, Rfl: 1    hydrOXYzine HCL (ATARAX) 25 MG tablet, Take 1 tablet (25 mg total) by mouth 2 (two) times daily as needed for Anxiety., Disp: 30 tablet, Rfl: 0    metFORMIN (GLUCOPHAGE-XR) 500 MG ER 24hr tablet, , Disp: , Rfl:     omeprazole (PRILOSEC) 40 MG capsule, Take 1 capsule (40 mg total) by mouth 2 (two) times daily before meals., Disp: 180 capsule, Rfl: 3    ondansetron (ZOFRAN-ODT) 4 MG TbDL, Take 1 tablet (4 mg total) by mouth every 6 (six) hours as needed., Disp: 30 tablet, Rfl: 3  No current facility-administered medications for this visit.    Current symptoms:  Depression Symptoms: insomnia and fatigue.  Anxiety Symptoms: excessive worrying, restlessness, and panic attacks.  Sleep Difficulties:  denies .  Manic Symptoms:  denies.  Psychosis: denies .    Risk assessment:  Patient reports no suicidal ideation  Patient reports no homicidal ideation  Patient reports no self-injurious behavior  Patient reports no violent behavior    Patient advised to call [phone] or present the the nearest ED if they experience suicidal or homicidal ideation, plan or intent.      Psychiatric History:  Diagnosis:    Current Psychiatric Medication: Yes - Atarax     Medication Trial History:  Medication Trials: Yes, Zoloft   Outpatient Treatment: Yes    Inpatient Treatment: No   Suicide Attempts: Yes    Access to Firearms: No   History of Trauma: Yes    Family Psychiatric History:      Current and Past Substance Use:  Alcohol: Patient denies alcohol use.     Drugs: Current: medical marijuana   Nicotine: denied   Caffeine:  denies     Mental Status Exam  General Appearance:  appears stated age, neatly dressed, well groomed   Speech: normal tone, normal rate, normal pitch, normal volume      Level of Cooperation: cooperative      Thought Processes: linear, logical, goal-directed   Mood: anxious, depressed      Thought Content: {relevant and appropriate   Affect: congruent and appropriate   Orientation: Oriented x4   Memory/Attention/Concentration: No gross cognitive deficits made evident during conversation   Judgment & Insight: poor   Language  intact         8/6/2021     8:52 AM   Results of the PHQ8   Little interest or pleasure in doing things More than half the days   Feeling down, depressed, or hopeless More than half the days   Trouble falling or staying asleep, or sleeping too much More than half the days   Feeling tired or having little energy More than half the days   Poor appetite or overeating Several days   Feeling bad about yourself - or that you are a failure or have let yourself or your family down More than half the days   Trouble concentrating on things, such as reading the newspaper or watching television More than half the days   Moving or speaking so slowly that other people could have noticed. Or the opposite - being so fidgety or restless that you have been moving around a lot more than usual More than half the days   Total Score  15           9/10/2024     2:00 PM 8/6/2021     8:52 AM 5/31/2021     3:37 PM   GAD7   1. Feeling nervous, anxious, or on edge? 3 3 3   2. Not being able to stop or control worrying? 3 2 3   3. Worrying too much about different things? 3 2 3   4. Trouble relaxing? 3 3 3   5. Being so restless that it is hard to sit still? 3 2 3   6. Becoming easily annoyed or irritable? 3 2 3   7. Feeling afraid as if something awful might happen? 3 2 3   8. If you checked off any problems, how difficult have these problems made it for  you to do your work, take care of things at home, or get along with other people? 3 1 3   TANK-7 Score 21 16 21       Impression: Initial appointment focused on gathering history, identifying treatment goals and developing a treatment plan.      Diagnosis:  1. TANK (generalized anxiety disorder)        2. Adjustment disorder, unspecified type  Ambulatory referral/consult to Primary Care Behavioral Health (Non-Opioids)      3. MDD (major depressive disorder), recurrent episode, moderate        4. PTSD (post-traumatic stress disorder)            Treatment Goals and Plan:   Anxiety: reducing negative automatic thoughts and reducing physical symptoms of anxiety    Future treatment will utilize CBT.      Return to Clinic: No follow-ups on file.

## 2024-09-30 ENCOUNTER — HOSPITAL ENCOUNTER (EMERGENCY)
Facility: HOSPITAL | Age: 24
Discharge: HOME | End: 2024-09-30
Attending: EMERGENCY MEDICINE
Payer: COMMERCIAL

## 2024-09-30 VITALS
DIASTOLIC BLOOD PRESSURE: 88 MMHG | RESPIRATION RATE: 18 BRPM | BODY MASS INDEX: 36.73 KG/M2 | OXYGEN SATURATION: 99 % | TEMPERATURE: 98 F | HEART RATE: 82 BPM | HEIGHT: 67 IN | SYSTOLIC BLOOD PRESSURE: 136 MMHG | WEIGHT: 234 LBS

## 2024-09-30 DIAGNOSIS — F41.9 ANXIETY: Primary | ICD-10-CM

## 2024-09-30 PROCEDURE — 25000003 PHARM REV CODE 250

## 2024-09-30 PROCEDURE — 99283 EMERGENCY DEPT VISIT LOW MDM: CPT

## 2024-09-30 RX ORDER — LORAZEPAM 1 MG/1
0.5 TABLET ORAL EVERY 6 HOURS PRN
Qty: 6 TABLET | Refills: 0 | Status: SHIPPED | OUTPATIENT
Start: 2024-09-30 | End: 2024-10-03

## 2024-09-30 RX ORDER — LORAZEPAM 0.5 MG/1
0.5 TABLET ORAL
Status: COMPLETED | OUTPATIENT
Start: 2024-09-30 | End: 2024-09-30

## 2024-09-30 RX ADMIN — LORAZEPAM 0.5 MG: 0.5 TABLET ORAL at 09:09

## 2024-09-30 NOTE — ED PROVIDER NOTES
"Encounter Date: 9/30/2024       History     Chief Complaint   Patient presents with    Anxiety     Having a lot of anxiety, denies suicidal homicidal ideation, states has stressful job     HPI  Ashok Cabello is a 24 y.o. female, presenting with complaints of panic attack. Pt reports being told if she felt she was not "in control" that she should report to the ED and after a panic attack prior to work she became worried which prompted her to present. Pt has new dx of panic disorder and has been following with talk therapist and attempted breathing exercises and grounding techniques without significant improvement in her symptoms.  She reports recently starting Atarax but did not feel it was helping this morning. She denies any suicidal ideations or thoughts of self harm. She reports noticing she was more anxious when she woke up today and that her symptoms were progressive as she got ready to leave for work. Pt reports high stress job as the source of her anxiety but is happy with the progress she has made in talk therapy thus far. Pt denies chest pain, SOB, sick symptoms.                  Review of patient's allergies indicates:   Allergen Reactions    Brimonidine Itching    Parsley (petroselinum sp) Hives and Rash    Pineapple Hives and Rash     Past Medical History:   Diagnosis Date    Allergy     Anxiety     Depression     Seizures     epilepsy at [age]    Sleep difficulties     Suicide attempt     Urticaria      Past Surgical History:   Procedure Laterality Date    BREAST RECONSTRUCTION  12/2020    BREAST SURGERY      reduction 12/2020    ESOPHAGOGASTRODUODENOSCOPY N/A 6/6/2024    Procedure: EGD (ESOPHAGOGASTRODUODENOSCOPY);  Surgeon: Tab Kerr MD;  Location: [facility] ENDOSCOPY;  Service: Endoscopy;  Laterality: N/A;  diabetic/ prep instructions sent to pt via portal/urgent / referral dr tabitha SANTOYO , Hope all is well . Pt is scheduled for her proceduree at [facility] she states at times they can " not find her veins , and she asked for an ultrasound machine be pr    keratoconus       Family History   Problem Relation Name Age of Onset    Hypertension Mother      Hypertension Father      Diabetes Father      Heart failure Father      Heart attack Father      Stomach cancer Maternal Grandmother      Hypertension Maternal Grandmother      Diabetes Maternal Grandmother      Thalassemia Maternal Grandmother      Hypertension Paternal Grandmother      Hyperlipidemia Paternal Grandmother      Heart failure Paternal Grandmother      Heart attack Paternal Grandmother       Social History     Tobacco Use    Smoking status: Never    Smokeless tobacco: Never   Substance Use Topics    Alcohol use: Yes     Comment: once every few months     Drug use: Never     Review of Systems    Physical Exam     Initial Vitals [09/30/24 0821]   BP Pulse Resp Temp SpO2   (!) 144/91 92 20 98.4 °F (36.9 °C) 100 %      MAP       --         Physical Exam    Constitutional: She appears well-developed and well-nourished. She is not diaphoretic. No distress.   Eyes: Conjunctivae and EOM are normal. No scleral icterus.   Neck:   Normal range of motion.  Cardiovascular:  Normal rate.           Pulmonary/Chest: No respiratory distress.   Musculoskeletal:      Cervical back: Normal range of motion.     Neurological: She is alert and oriented to person, place, and time. GCS score is 15. GCS eye subscore is 4. GCS verbal subscore is 5. GCS motor subscore is 6.   Skin: Skin is warm and dry.   Psychiatric: Her behavior is normal. Judgment and thought content normal. Her mood appears anxious. Her affect is not angry, not blunt, not labile and not inappropriate. Her speech is not delayed and not slurred. She is not agitated, not aggressive, not hyperactive, not slowed, not withdrawn, not actively hallucinating and not combative. Thought content is not paranoid and not delusional. Cognition and memory are not impaired. She does not express impulsivity or  inappropriate judgment. She expresses no homicidal and no suicidal ideation. She expresses no suicidal plans. She exhibits normal recent memory and normal remote memory. She is attentive.         ED Course   Procedures  Labs Reviewed - No data to display       Imaging Results    None          Medications   LORazepam tablet 0.5 mg (0.5 mg Oral Given 9/30/24 0959)     Medical Decision Making  Ashok Cabello  is a 24 y.o. female, presenting with complaints of anxiety, history of present illness obtained from pt as seen above. At time of initial exam patient resting in bed, able to provide adequate history of present illness and tolerated physical exam well. Patient found to be mild distress, stable. Exam notable as above.     DDX includes but is not limited to: panic disorder, PTSD, depression, anxiety, ACS, PNA.  Pt reports new dx of panic attacks and has started treatment with talk therapy, uses Atarax as abortive. I reinforced her progress in talk therapy and encouraged her to continue seeing her therapist and using the tools she learned. We discussed potential benefit from a daily anxiolytic in addition to her abortive therapy and pt seemed interested in trying medication management. Referral placed for psychiatry to establish care. Pt provided Ativan in ED with significant improvement in her previous symptoms. Pt provided short course ativan PRN for panic attack symptoms while awaiting follow up with psychiatry for more definitive treatment.     Consideration was given for admission, but shared decision making was utilized with the patient.  Decision made the patient was stable for outpatient management.     Data Reviewed/Counseling: I have reviewed the patient's vital signs, nursing notes, and other relevant tests/information. I had a detailed discussion regarding the historical points, exam findings, and any diagnostic results supporting the discharge diagnosis. Any incidental findings were discussed with the  patient. I also discussed the need for outpatient follow-up and the need to return to the ED if symptoms worsen or if there are any questions or concerns that arise at home. Strict return and follow-up precautions have been given by me personally to the patient/family/caregiver(s).  Disposition: Discharged     Risk  Prescription drug management.              Attending Attestation:   Physician Attestation Statement for Resident:  As the supervising MD   Physician Attestation Statement: I have personally seen and examined this patient.   I agree with the above history.  -: Anxiety   As the supervising MD I agree with the above PE.     As the supervising MD I agree with the above treatment, course, plan, and disposition.                    ED Course as of 10/01/24 1451   Mon Sep 30, 2024   1035 On exam patient reporting improvement in previous anxiety symptoms and feels comfortable with discharge at this time a follow-up with psychiatry, referral has been placed. [HB]      ED Course User Index  [HB] Evelyn Alvarez MD                           Clinical Impression:  Final diagnoses:  [F41.9] Anxiety (Primary)          ED Disposition Condition    Discharge Stable          ED Prescriptions       Medication Sig Dispense Start Date End Date Auth. Provider    LORazepam (ATIVAN) 1 MG tablet Take 0.5 tablets (0.5 mg total) by mouth every 6 (six) hours as needed for Anxiety. 6 tablet 9/30/2024 10/3/2024 Evelyn Alvarez MD          Follow-up Information       Follow up With Specialties Details Why Contact Info    Your Primary Care Provider  Schedule an appointment as soon as possible for a visit in 1 week  Follow up with your PCP as soon as possible. If you do not have a PCP, please follow up with [facility], you may contact them to schedule an appointment .    [facility] - Emergency Dept Emergency Medicine Go to  As needed, If symptoms worsen [address]  [facility] [id number] [phone]              Evelyn Alvarez MD  Resident  09/30/24 2016       Mariano Blanton III, MD  10/01/24 2574

## 2024-09-30 NOTE — ED NOTES
Ashok Cabello, an 24 y.o. female presents to the ED via POV for complaints of anxiety that started this AM. States that she woke up this AM extremely anxious. Denies SI/HI, audio or visual hallucinations. Pt does take anti anxiety meds and states that she did take her medication this AM. Endorses chest pain, SOB, back pain, neck pain, and shoulder pain. Pt reports having a lot of anxiety towards her job. Attempting doing grounding activities without relief.      LOC: The patient is awake, alert and aware of environment with an appropriate affect, the patient is oriented x 3 and speaking appropriately.   APPEARANCE: Patient appears comfortable and in no acute distress, patient is clean and well groomed. +tearful  SKIN: The skin is warm and dry, color consistent with ethnicity.   MUSCULOSKELETAL: Patient moving all extremities spontaneously, no swelling noted. +back pain +neck pain  RESPIRATORY: Airway is open and patent, respirations are spontaneous, patient has a normal effort and rate, no accessory muscle use noted. +SOB  CARDIAC: Patient has a normal rate and regular rhythm, no edema noted, capillary refill < 3 seconds. +CP  GASTRO: Soft and non tender to palpation, no distention noted.   : Pt denies any pain or frequency with urination.  NEURO: Pt opens eyes spontaneously, behavior appropriate to situation, follows commands.        Chief Complaint   Patient presents with    Anxiety     Having a lot of anxiety, denies suicidal homicidal ideation, states has stressful job     Review of patient's allergies indicates:   Allergen Reactions    Brimonidine Itching    Parsley (petroselinum sp) Hives and Rash    Pineapple Hives and Rash     Past Medical History:   Diagnosis Date    Allergy     Anxiety     Depression     Seizures     epilepsy at [age]    Sleep difficulties     Suicide attempt     Urticaria

## 2024-09-30 NOTE — DISCHARGE INSTRUCTIONS
Diagnosis: Anxiety    Treatments you had today:   Medications   LORazepam tablet 0.5 mg (has no administration in time range)     Follow-Up Plan:  - We have placed a referral with psychiatry, please look out for their call to schedule this appointment.   -We gave you ativan today for your symptoms and will provide a short course for at home. Please use this medication for severe anxiety symptoms as directed and do not use while operating heavy machinery including driving a car as it can make you drowsy. Please do not drink alcohol with this medication.     Return to the Emergency Department for symptoms including but not limited to: worsening symptoms, shortness of breath or chest pain, vomiting with inability to hold down fluids, fevers greater than 100.4°F, passing out/fainting/unconsciousness, or other concerning symptoms.    We would like to thank you for coming today and our hope is that we served you and your family well during your stay

## 2024-09-30 NOTE — Clinical Note
"Ashok "Erendiramaryann Cabello was seen and treated in our emergency department on 9/30/2024.  She may return to work on 10/01/2024.       If you have any questions or concerns, please don't hesitate to call.      Evelyn Alvarez MD"

## 2024-10-15 ENCOUNTER — CLINICAL SUPPORT (OUTPATIENT)
Dept: BEHAVIORAL HEALTH | Facility: CLINIC | Age: 24
End: 2024-10-15
Payer: COMMERCIAL

## 2024-10-15 DIAGNOSIS — F41.1 GAD (GENERALIZED ANXIETY DISORDER): ICD-10-CM

## 2024-10-15 DIAGNOSIS — F33.1 MDD (MAJOR DEPRESSIVE DISORDER), RECURRENT EPISODE, MODERATE: ICD-10-CM

## 2024-10-15 DIAGNOSIS — F43.10 PTSD (POST-TRAUMATIC STRESS DISORDER): Primary | ICD-10-CM

## 2024-10-15 PROCEDURE — 90832 PSYTX W PT 30 MINUTES: CPT | Mod: 95,,, | Performed by: COUNSELOR

## 2024-10-15 NOTE — PROGRESS NOTES
Primary Care Behavioral Health Integration: Follow-up  Date:  10/15/2024  Patient Name: Ashok Cabello  Type of Visit:  Video Session  Site:  [facility]  Referral Source:  Marly Curran MD    Visit type: Virtual visit with synchronous audio and video  The patient location is:  Home  The patient location [place] is: [place]  The patient phone number is: [phone]    Each patient to whom he or she provides medical services by telemedicine is:  (1) informed of the relationship between the physician and patient and the respective role of any other health care provider with respect to management of the patient; and (2) notified that he or she may decline to receive medical services by telemedicine and may withdraw from such care at any time.    History of Present Illness:  Ashok Cabello, a 24 y.o.  female with history of Anxiety disorders; generalized anxiety disorder [F41.1] and Major Depressive Disorder, Recurrent, Moderate (F33.1) referred by Marly Curran MD.  Patient was seen, examined and chart was reviewed.    Met with patient.     Chief complaint/reason for encounter: depression and anxiety     Current session: Patient reported quitting her job 2 weeks ago. Said work environment was very stressful. Has started applying part-time jobs. Is seeing another therapist along with me. Will go to psychiatry at the end of the month. Pt's sleep got worse and anxiety got worse. Went by ambulance to the hospital. That's when she decided to quit her job. Says since she's quit her job she feels so much better and not having panic attacks. Is relying on blind kev that all will work out. Has family support with this decision. Will help pt financially if they can. Pt's been estranged with father for a year. Says he's the reason she has mental stress. Suggested to start journaling.     Treatment plan:  Target symptoms: depression, anxiety   Why chosen therapy is appropriate versus  another modality: relevant to diagnosis  Outcome monitoring methods: self-report, observation  Therapeutic intervention type: supportive psychotherapy    Risk parameters:  Patient reports no suicidal ideation  Patient reports no homicidal ideation  Patient reports no self-injurious behavior  Patient reports no violent behavior    Verbal deficits: None    Patient's response to intervention:  The patient's response to intervention is accepting.    Progress toward goals and other mental status changes:  The patient's progress toward goals is limited.    Patient advised to call [phone] or present the the nearest ED if they experience suicidal or homicidal ideation, plan or intent.          8/6/2021     8:52 AM   Results of the PHQ8   Little interest or pleasure in doing things More than half the days   Feeling down, depressed, or hopeless More than half the days   Trouble falling or staying asleep, or sleeping too much More than half the days   Feeling tired or having little energy More than half the days   Poor appetite or overeating Several days   Feeling bad about yourself - or that you are a failure or have let yourself or your family down More than half the days   Trouble concentrating on things, such as reading the newspaper or watching television More than half the days   Moving or speaking so slowly that other people could have noticed. Or the opposite - being so fidgety or restless that you have been moving around a lot more than usual More than half the days   Total Score  15           9/10/2024     2:00 PM 8/6/2021     8:52 AM 5/31/2021     3:37 PM   GAD7   1. Feeling nervous, anxious, or on edge? 3 3  3   2. Not being able to stop or control worrying? 3 2  3   3. Worrying too much about different things? 3 2  3   4. Trouble relaxing? 3 3  3   5. Being so restless that it is hard to sit still? 3 2  3   6. Becoming easily annoyed or irritable? 3 2  3   7. Feeling afraid as if something awful might happen? 3 2   3   8. If you checked off any problems, how difficult have these problems made it for you to do your work, take care of things at home, or get along with other people? 3 1  3   TANK-7 Score 21 16 21       Patient-reported       Diagnosis:     ICD-10-CM ICD-9-CM   1. PTSD (post-traumatic stress disorder)  F43.10 309.81   2. MDD (major depressive disorder), recurrent episode, moderate  F33.1 296.32   3. TANK (generalized anxiety disorder)  F41.1 300.02       Plan: Pt plans to continue individual therapy.    Return to clinic: 2 weeks    Length of Service (minutes): 30    Non face-to-face clinical time (minutes): 15

## 2024-11-22 ENCOUNTER — PATIENT MESSAGE (OUTPATIENT)
Dept: RESEARCH | Facility: HOSPITAL | Age: 24
End: 2024-11-22
Payer: COMMERCIAL

## 2024-12-05 ENCOUNTER — OFFICE VISIT (OUTPATIENT)
Dept: PAIN MEDICINE | Facility: CLINIC | Age: 24
End: 2024-12-05
Payer: COMMERCIAL

## 2024-12-05 VITALS
HEART RATE: 84 BPM | SYSTOLIC BLOOD PRESSURE: 133 MMHG | HEIGHT: 67 IN | DIASTOLIC BLOOD PRESSURE: 80 MMHG | BODY MASS INDEX: 36.72 KG/M2 | WEIGHT: 233.94 LBS

## 2024-12-05 DIAGNOSIS — M54.9 CHRONIC BACK PAIN, UNSPECIFIED BACK LOCATION, UNSPECIFIED BACK PAIN LATERALITY: ICD-10-CM

## 2024-12-05 DIAGNOSIS — R52 WHOLE BODY PAIN: Primary | ICD-10-CM

## 2024-12-05 DIAGNOSIS — M79.7 FIBROMYALGIA: ICD-10-CM

## 2024-12-05 DIAGNOSIS — G89.29 CHRONIC BACK PAIN, UNSPECIFIED BACK LOCATION, UNSPECIFIED BACK PAIN LATERALITY: ICD-10-CM

## 2024-12-05 PROCEDURE — 99999 PR PBB SHADOW E&M-EST. PATIENT-LVL III: CPT | Mod: PBBFAC,,, | Performed by: STUDENT IN AN ORGANIZED HEALTH CARE EDUCATION/TRAINING PROGRAM
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
## 2024-12-05 NOTE — PATIENT INSTRUCTIONS
After you stop the Cymbalta for a few weeks send me a message and we can look into starting Pregabalin (Lyrica) for fibromyalgia pain.    Stop the baclofen now.  After stopping the Cymbalta for a week or two, you can reintroduce the baclofen at night and see how it makes you feel.

## 2025-01-08 ENCOUNTER — PATIENT MESSAGE (OUTPATIENT)
Dept: PAIN MEDICINE | Facility: CLINIC | Age: 25
End: 2025-01-08
Payer: COMMERCIAL

## 2025-02-13 ENCOUNTER — TELEPHONE (OUTPATIENT)
Dept: PAIN MEDICINE | Facility: CLINIC | Age: 25
End: 2025-02-13
Payer: COMMERCIAL

## 2025-05-07 ENCOUNTER — PATIENT MESSAGE (OUTPATIENT)
Dept: PRIMARY CARE CLINIC | Facility: CLINIC | Age: 25
End: 2025-05-07
Payer: COMMERCIAL